# Patient Record
Sex: MALE | Race: OTHER | HISPANIC OR LATINO | URBAN - METROPOLITAN AREA
[De-identification: names, ages, dates, MRNs, and addresses within clinical notes are randomized per-mention and may not be internally consistent; named-entity substitution may affect disease eponyms.]

---

## 2019-02-11 PROBLEM — Z00.00 ENCOUNTER FOR PREVENTIVE HEALTH EXAMINATION: Status: ACTIVE | Noted: 2019-02-11

## 2019-02-13 ENCOUNTER — OUTPATIENT (OUTPATIENT)
Dept: OUTPATIENT SERVICES | Facility: HOSPITAL | Age: 49
LOS: 1 days | End: 2019-02-13
Payer: COMMERCIAL

## 2019-02-13 ENCOUNTER — APPOINTMENT (OUTPATIENT)
Dept: CT IMAGING | Facility: CLINIC | Age: 49
End: 2019-02-13
Payer: COMMERCIAL

## 2019-02-13 DIAGNOSIS — Z00.8 ENCOUNTER FOR OTHER GENERAL EXAMINATION: ICD-10-CM

## 2019-02-13 PROCEDURE — 74177 CT ABD & PELVIS W/CONTRAST: CPT | Mod: 26

## 2019-02-13 PROCEDURE — 74177 CT ABD & PELVIS W/CONTRAST: CPT

## 2019-04-27 ENCOUNTER — INPATIENT (INPATIENT)
Facility: HOSPITAL | Age: 49
LOS: 4 days | Discharge: HOME | DRG: 418 | End: 2019-05-02
Attending: SURGERY | Admitting: SURGERY
Payer: COMMERCIAL

## 2019-04-27 VITALS
HEIGHT: 66 IN | DIASTOLIC BLOOD PRESSURE: 79 MMHG | WEIGHT: 210.1 LBS | SYSTOLIC BLOOD PRESSURE: 143 MMHG | OXYGEN SATURATION: 95 % | TEMPERATURE: 99 F | RESPIRATION RATE: 20 BRPM | HEART RATE: 65 BPM

## 2019-04-27 DIAGNOSIS — Z95.2 PRESENCE OF PROSTHETIC HEART VALVE: Chronic | ICD-10-CM

## 2019-04-27 DIAGNOSIS — Z95.0 PRESENCE OF CARDIAC PACEMAKER: Chronic | ICD-10-CM

## 2019-04-27 DIAGNOSIS — K80.20 CALCULUS OF GALLBLADDER WITHOUT CHOLECYSTITIS WITHOUT OBSTRUCTION: ICD-10-CM

## 2019-04-27 LAB
ALBUMIN SERPL ELPH-MCNC: 4.2 G/DL — SIGNIFICANT CHANGE UP (ref 3.3–5.2)
ALP SERPL-CCNC: 71 U/L — SIGNIFICANT CHANGE UP (ref 40–120)
ALT FLD-CCNC: 243 U/L — HIGH
ANION GAP SERPL CALC-SCNC: 13 MMOL/L — SIGNIFICANT CHANGE UP (ref 5–17)
APPEARANCE UR: CLEAR — SIGNIFICANT CHANGE UP
AST SERPL-CCNC: 280 U/L — HIGH
BACTERIA # UR AUTO: ABNORMAL
BASOPHILS # BLD AUTO: 0 K/UL — SIGNIFICANT CHANGE UP (ref 0–0.2)
BASOPHILS NFR BLD AUTO: 0.1 % — SIGNIFICANT CHANGE UP (ref 0–2)
BILIRUB SERPL-MCNC: 2.8 MG/DL — HIGH (ref 0.4–2)
BILIRUB UR-MCNC: ABNORMAL
BUN SERPL-MCNC: 15 MG/DL — SIGNIFICANT CHANGE UP (ref 8–20)
CALCIUM SERPL-MCNC: 9.1 MG/DL — SIGNIFICANT CHANGE UP (ref 8.6–10.2)
CHLORIDE SERPL-SCNC: 100 MMOL/L — SIGNIFICANT CHANGE UP (ref 98–107)
CO2 SERPL-SCNC: 27 MMOL/L — SIGNIFICANT CHANGE UP (ref 22–29)
COLOR SPEC: YELLOW — SIGNIFICANT CHANGE UP
CREAT SERPL-MCNC: 0.95 MG/DL — SIGNIFICANT CHANGE UP (ref 0.5–1.3)
DIFF PNL FLD: ABNORMAL
EOSINOPHIL # BLD AUTO: 0 K/UL — SIGNIFICANT CHANGE UP (ref 0–0.5)
EOSINOPHIL NFR BLD AUTO: 0.3 % — SIGNIFICANT CHANGE UP (ref 0–5)
EPI CELLS # UR: SIGNIFICANT CHANGE UP
GLUCOSE SERPL-MCNC: 91 MG/DL — SIGNIFICANT CHANGE UP (ref 70–115)
GLUCOSE UR QL: NEGATIVE MG/DL — SIGNIFICANT CHANGE UP
HCT VFR BLD CALC: 44.2 % — SIGNIFICANT CHANGE UP (ref 42–52)
HGB BLD-MCNC: 14.9 G/DL — SIGNIFICANT CHANGE UP (ref 14–18)
KETONES UR-MCNC: NEGATIVE — SIGNIFICANT CHANGE UP
LEUKOCYTE ESTERASE UR-ACNC: ABNORMAL
LIDOCAIN IGE QN: 26 U/L — SIGNIFICANT CHANGE UP (ref 22–51)
LYMPHOCYTES # BLD AUTO: 1 K/UL — SIGNIFICANT CHANGE UP (ref 1–4.8)
LYMPHOCYTES # BLD AUTO: 14.9 % — LOW (ref 20–55)
MCHC RBC-ENTMCNC: 29.3 PG — SIGNIFICANT CHANGE UP (ref 27–31)
MCHC RBC-ENTMCNC: 33.7 G/DL — SIGNIFICANT CHANGE UP (ref 32–36)
MCV RBC AUTO: 87 FL — SIGNIFICANT CHANGE UP (ref 80–94)
MONOCYTES # BLD AUTO: 0.6 K/UL — SIGNIFICANT CHANGE UP (ref 0–0.8)
MONOCYTES NFR BLD AUTO: 8.3 % — SIGNIFICANT CHANGE UP (ref 3–10)
NEUTROPHILS # BLD AUTO: 5.3 K/UL — SIGNIFICANT CHANGE UP (ref 1.8–8)
NEUTROPHILS NFR BLD AUTO: 76.3 % — HIGH (ref 37–73)
NITRITE UR-MCNC: NEGATIVE — SIGNIFICANT CHANGE UP
PH UR: 7 — SIGNIFICANT CHANGE UP (ref 5–8)
PLATELET # BLD AUTO: 188 K/UL — SIGNIFICANT CHANGE UP (ref 150–400)
POTASSIUM SERPL-MCNC: 3.6 MMOL/L — SIGNIFICANT CHANGE UP (ref 3.5–5.3)
POTASSIUM SERPL-SCNC: 3.6 MMOL/L — SIGNIFICANT CHANGE UP (ref 3.5–5.3)
PROT SERPL-MCNC: 7.3 G/DL — SIGNIFICANT CHANGE UP (ref 6.6–8.7)
PROT UR-MCNC: 15 MG/DL
RBC # BLD: 5.08 M/UL — SIGNIFICANT CHANGE UP (ref 4.6–6.2)
RBC # FLD: 13.9 % — SIGNIFICANT CHANGE UP (ref 11–15.6)
RBC CASTS # UR COMP ASSIST: ABNORMAL /HPF (ref 0–4)
SODIUM SERPL-SCNC: 140 MMOL/L — SIGNIFICANT CHANGE UP (ref 135–145)
SP GR SPEC: 1.01 — SIGNIFICANT CHANGE UP (ref 1.01–1.02)
UROBILINOGEN FLD QL: 4 MG/DL
WBC # BLD: 7 K/UL — SIGNIFICANT CHANGE UP (ref 4.8–10.8)
WBC # FLD AUTO: 7 K/UL — SIGNIFICANT CHANGE UP (ref 4.8–10.8)
WBC UR QL: SIGNIFICANT CHANGE UP

## 2019-04-27 PROCEDURE — 99285 EMERGENCY DEPT VISIT HI MDM: CPT

## 2019-04-27 PROCEDURE — 99223 1ST HOSP IP/OBS HIGH 75: CPT

## 2019-04-27 PROCEDURE — 76705 ECHO EXAM OF ABDOMEN: CPT | Mod: 26

## 2019-04-27 RX ORDER — ENOXAPARIN SODIUM 100 MG/ML
40 INJECTION SUBCUTANEOUS DAILY
Qty: 0 | Refills: 0 | Status: DISCONTINUED | OUTPATIENT
Start: 2019-04-27 | End: 2019-04-29

## 2019-04-27 RX ORDER — ONDANSETRON 8 MG/1
4 TABLET, FILM COATED ORAL EVERY 4 HOURS
Qty: 0 | Refills: 0 | Status: DISCONTINUED | OUTPATIENT
Start: 2019-04-27 | End: 2019-05-01

## 2019-04-27 RX ORDER — SODIUM CHLORIDE 9 MG/ML
1000 INJECTION, SOLUTION INTRAVENOUS
Qty: 0 | Refills: 0 | Status: DISCONTINUED | OUTPATIENT
Start: 2019-04-27 | End: 2019-05-01

## 2019-04-27 RX ORDER — CEFOTETAN DISODIUM 1 G
VIAL (EA) INJECTION
Qty: 0 | Refills: 0 | Status: DISCONTINUED | OUTPATIENT
Start: 2019-04-27 | End: 2019-05-01

## 2019-04-27 RX ORDER — CEFOTETAN DISODIUM 1 G
2 VIAL (EA) INJECTION ONCE
Qty: 0 | Refills: 0 | Status: COMPLETED | OUTPATIENT
Start: 2019-04-27 | End: 2019-04-27

## 2019-04-27 RX ORDER — ONDANSETRON 8 MG/1
4 TABLET, FILM COATED ORAL ONCE
Qty: 0 | Refills: 0 | Status: COMPLETED | OUTPATIENT
Start: 2019-04-27 | End: 2019-04-27

## 2019-04-27 RX ORDER — SODIUM CHLORIDE 9 MG/ML
1000 INJECTION INTRAMUSCULAR; INTRAVENOUS; SUBCUTANEOUS ONCE
Qty: 0 | Refills: 0 | Status: COMPLETED | OUTPATIENT
Start: 2019-04-27 | End: 2019-04-27

## 2019-04-27 RX ORDER — MORPHINE SULFATE 50 MG/1
1 CAPSULE, EXTENDED RELEASE ORAL EVERY 4 HOURS
Qty: 0 | Refills: 0 | Status: DISCONTINUED | OUTPATIENT
Start: 2019-04-27 | End: 2019-04-29

## 2019-04-27 RX ORDER — CEFOTETAN DISODIUM 1 G
2 VIAL (EA) INJECTION EVERY 12 HOURS
Qty: 0 | Refills: 0 | Status: DISCONTINUED | OUTPATIENT
Start: 2019-04-28 | End: 2019-05-01

## 2019-04-27 RX ORDER — SODIUM CHLORIDE 9 MG/ML
3 INJECTION INTRAMUSCULAR; INTRAVENOUS; SUBCUTANEOUS ONCE
Qty: 0 | Refills: 0 | Status: COMPLETED | OUTPATIENT
Start: 2019-04-27 | End: 2019-04-27

## 2019-04-27 RX ADMIN — SODIUM CHLORIDE 3 MILLILITER(S): 9 INJECTION INTRAMUSCULAR; INTRAVENOUS; SUBCUTANEOUS at 19:30

## 2019-04-27 RX ADMIN — MORPHINE SULFATE 1 MILLIGRAM(S): 50 CAPSULE, EXTENDED RELEASE ORAL at 22:03

## 2019-04-27 RX ADMIN — ONDANSETRON 4 MILLIGRAM(S): 8 TABLET, FILM COATED ORAL at 22:04

## 2019-04-27 RX ADMIN — SODIUM CHLORIDE 75 MILLILITER(S): 9 INJECTION, SOLUTION INTRAVENOUS at 22:04

## 2019-04-27 RX ADMIN — Medication 100 GRAM(S): at 22:29

## 2019-04-27 RX ADMIN — SODIUM CHLORIDE 1000 MILLILITER(S): 9 INJECTION INTRAMUSCULAR; INTRAVENOUS; SUBCUTANEOUS at 19:00

## 2019-04-27 NOTE — ED STATDOCS - NS ED ROS FT
Review of Systems  •	CONSTITUTIONAL - no  fever, no diaphoresis, no weight change  •	SKIN - no rash  •	HEMATOLOGIC - no bleeding, no bruising  •	EYES - no eye pain, no blurred vision  •	ENT - no change in hearing, no pain  •	RESPIRATORY - no shortness of breath, no cough  •	CARDIAC - no chest pain, no palpitations  •	GI - (+) abd pain,(+) nausea, (+) vomiting, no diarrhea, no constipation, no bleeding  •	GENITO-URINARY - no discharge, no dysuria; no hematuria,   •	ENDO - no polydypsia, no polyurea, no heat/no cold intolerance  •	MUSCULOSKELETAL - no joint pain, no swelling, no redness  •	NEUROLOGIC - no weakness, no headache, no anesthesia, no paresthesias  •	PSYCH - no anxiety, non suicidal, non homicidal, no hallucination, no depression

## 2019-04-27 NOTE — ED STATDOCS - OBJECTIVE STATEMENT
47 y/o M with PMHx tetralogy of fallot s/p multiple open heart surgeries , presents to ED c/o abdominal pain since this morning. 2am this morning pt woke up in pain in abdominal region and back. Went to bathroom and had an episode of emesis. Tried to go back to sleep. Again woke up at 6am and had another episode of emesis. 9am pt had breakfast and then vomited it all up. Has not been able to tolerate solids PO, but has been tolerating water. 1 month ago pt had a diverticulitis attack. 3 years ago pt had a gallbladder attack that did not require removal of the gallbladder. Denies diarrhea, SOB, CP.   EKG done a urgent care shows left bundle branch block.   Dr. Akil Navarrete - [place] - cardiologist 49 y/o M with PMHx tetralogy of fallot s/p multiple open heart surgeries , presents to ED c/o abdominal pain since this morning. 2am this morning pt woke up in pain in abdominal region and back. Went to bathroom and had an episode of emesis. Tried to go back to sleep. Again woke up at 6am and had another episode of emesis. 9am pt had breakfast and then vomited it all up. Has not been able to tolerate solids PO, but has been tolerating water. 1 month ago pt had a "diverticulitis attack". 3 years ago pt had a "gallbladder attack" that did not require removal of the gallbladder. Denies diarrhea, SOB, CP.   EKG done a urgent care shows left bundle branch block.   Dr. Akil Navarrete - [place] - cardiologist

## 2019-04-27 NOTE — ED ADULT NURSE NOTE - OBJECTIVE STATEMENT
48 yr old male a+ox4 presents to ED c/o RUQ pain and epigastric pain 48 yr old male a+ox4 presents to ED c/o RUQ pain and epigastric pain.  pt was sent to ED from urgent care where he had an abnormal ekg.  pt has hx multiple valve replacements and pacemaker.  pt states that "years ago" he had a problem with his gallbladder that did not require surgery.  pt also reports recently having had a diverticulitis flair up.

## 2019-04-27 NOTE — ED STATDOCS - PROGRESS NOTE DETAILS
pt is seen by dr durham initially agreed with hx - PE plan   recurrent hx of gallstone   cholelithiases and elevated LFT called Surgery team to evaluate the pt surgery seen and evaluated the patient, will admit to surgical services

## 2019-04-27 NOTE — H&P ADULT - PROBLEM SELECTOR PLAN 1
1. Admit to ACS/Trauma under Dr. Nino   2. NPO, IVFs and IV ABx   3. MRCP ordered  4. Consult GI (given elevated T bili) and cards (given patient's cardiac Hx)   5. Hepatitis panel ordered for AM labs 1. Admit to ACS/Trauma under Dr. Nino   2. NPO, IVFs and IV ABx   3. Unable to obtain MRCP given presence of pacemaker  4. Consult GI for ERCP and cards for risk stratification (given patient's cardiac Hx)   5. Hepatitis panel ordered for AM labs 1. Admit to ACS/Trauma under Dr. Nino   2. NPO, IVFs and IV ABx   3. Unable to obtain MRCP given presence of pacemaker  4. Consult GI for ERCP (Dr. Timmons [phone]) and cards (Dr. Myrick [phone]) for risk stratification (given patient's cardiac Hx)   5. Hepatitis panel ordered for AM labs 1. Admit to ACS/Trauma under Dr. Nino   2. NPO, IVFs and IV ABx   3. Unable to obtain MRCP given presence of pacemaker that is not MRI compatible   4. Consult GI for ERCP (Dr. Timmons [phone]) and cards (Dr. Myrick [phone]) for risk stratification (given patient's cardiac Hx)   5. Hepatitis panel ordered for AM labs

## 2019-04-27 NOTE — ED STATDOCS - ATTENDING CONTRIBUTION TO CARE
I, Eduardo Fischer, performed the initial face to face bedside interview with this patient regarding history of present illness, review of symptoms and relevant past medical, social and family history.  I completed an independent physical examination.  I was the initial provider who evaluated this patient. I have signed out the follow up of any pending tests (i.e. labs, radiological studies) to the ACP.  I have communicated the patient’s plan of care and disposition with the ACP.

## 2019-04-27 NOTE — ED ADULT TRIAGE NOTE - CHIEF COMPLAINT QUOTE
Patient arrived to ED today with c/o abdominal pain and vomiting since 2am.  Patient states that he took Motrin this AM due to pain.

## 2019-04-27 NOTE — H&P ADULT - ASSESSMENT
48 year old male with symptomatic cholelithiasis with plan to r/u choledocholithiasis given elevated T bili currently afebrile and hemodynamically stable ordered for MRCP.

## 2019-04-27 NOTE — ED STATDOCS - CLINICAL SUMMARY MEDICAL DECISION MAKING FREE TEXT BOX
Pt with congestive heart disease, hx of diverticulitis, presents with epigastric pain. Will get blood work and US of gallbladder to rule out gallstones.

## 2019-04-27 NOTE — H&P ADULT - NSHPPHYSICALEXAM_GEN_ALL_CORE
Constitutional: Patient resting comfortably in bed in no acute distress   HEENT: No scleral icterus   Neck: No JVD, full ROM w/o pain   Respiratory: CTAB with unlabored respirations, no accessory muscle use or conversational dyspnea   Cardiovascular: Well healed midline sternal scar, normal S1 and S2   Gastrointestinal: Abdomen soft, mild epigastric tender, non-distended with no rebound tenderness or guarding   Rectal: Not indicated   Neurological: GCS 15 (E4V5M6) with no gross sensory, motor or coordination deficits   Psychiatric: Normal mood and affect   Musculoskeletal: No joint pain, swelling or deformity with no limitation of movement

## 2019-04-27 NOTE — ED ADULT NURSE NOTE - NSIMPLEMENTINTERV_GEN_ALL_ED
Implemented All Universal Safety Interventions:  [place] to call system. Call bell, personal items and telephone within reach. Instruct patient to call for assistance. Room bathroom lighting operational. Non-slip footwear when patient is off stretcher. Physically safe environment: no spills, clutter or unnecessary equipment. Stretcher in lowest position, wheels locked, appropriate side rails in place.

## 2019-04-27 NOTE — ED STATDOCS - PHYSICAL EXAMINATION
VITAL SIGNS: I have reviewed nursing notes and confirm.  CONSTITUTIONAL: Well-developed; well-nourished; in no acute distress.  SKIN: Skin exam is warm and dry, no acute rash.  HEAD: Normocephalic; atraumatic.  EYES: PERRL, EOM intact; conjunctiva and sclera clear.  ENT: No nasal discharge; airway clear. Throat clear.  NECK: Supple; non tender.    CARD: S1, S2 normal; no murmurs, gallops, or rubs. Regular rate and rhythm.  RESP: No wheezes,  no rales or rhonchi.   ABD:  soft; non-distended; (+) epigastric pain   EXT: Normal ROM. No clubbing, cyanosis or edema.  NEURO: Alert, oriented. Grossly unremarkable. No focal deficits. no facial droop, moves all extremities  PSYCH: Cooperative, appropriate.

## 2019-04-27 NOTE — H&P ADULT - HISTORY OF PRESENT ILLNESS
48 year old male presenting with chief complaint of abdominal pain x 1 day. Patient seen and examined at bedside with senior resident. Reports that he was awoken from sleep this morning with severe epigastric pain radiating ot RUQ and right back. Pain rated 101/10 and described as sharp, constant. Associated with multiple episodes (more than 5) of non bloody non bilious emesis. ROS associated with subjective fevers and chills. Having normal bowel function (last BM day prior to presentation) ROS negative for dizziness/lightheadedness, chest pain, SOB, dyspnea, dysuria or changes in bowel habits.     PMHx: Tetralogy of Fallot - Cardiologist Dr. Akil Navarrete @ [facility]   PSHx: Open heart surgeries (6 months, 6yr old) Pacemaker, stent placement and valve repair kiara 5yrs ago - CTS Dr. Soriano @ [facility]    Allergies: None   Meds: Baby ASA, Metoprolol 25mg BID, losartan-HCTZ 100-25mg QD, started taking xarelto after cardiac stent placement and stopped it 2-3yrs ago   FamHx: Lung CA in maternal grandmother, bladder CA in father, pancreatic CA in maternal aunt   SocHx: Former social smoker (high school and college) denies EtOH and IV drug use

## 2019-04-28 LAB
ALBUMIN SERPL ELPH-MCNC: 3.8 G/DL — SIGNIFICANT CHANGE UP (ref 3.3–5.2)
ALP SERPL-CCNC: 67 U/L — SIGNIFICANT CHANGE UP (ref 40–120)
ALT FLD-CCNC: 203 U/L — HIGH
ANION GAP SERPL CALC-SCNC: 14 MMOL/L — SIGNIFICANT CHANGE UP (ref 5–17)
AST SERPL-CCNC: 134 U/L — HIGH
BASOPHILS # BLD AUTO: 0 K/UL — SIGNIFICANT CHANGE UP (ref 0–0.2)
BASOPHILS NFR BLD AUTO: 0.2 % — SIGNIFICANT CHANGE UP (ref 0–2)
BILIRUB DIRECT SERPL-MCNC: 0.3 MG/DL — SIGNIFICANT CHANGE UP (ref 0–0.3)
BILIRUB INDIRECT FLD-MCNC: 1.5 MG/DL — HIGH (ref 0.2–1)
BILIRUB SERPL-MCNC: 1.8 MG/DL — SIGNIFICANT CHANGE UP (ref 0.4–2)
BUN SERPL-MCNC: 15 MG/DL — SIGNIFICANT CHANGE UP (ref 8–20)
CALCIUM SERPL-MCNC: 8.7 MG/DL — SIGNIFICANT CHANGE UP (ref 8.6–10.2)
CHLORIDE SERPL-SCNC: 103 MMOL/L — SIGNIFICANT CHANGE UP (ref 98–107)
CO2 SERPL-SCNC: 26 MMOL/L — SIGNIFICANT CHANGE UP (ref 22–29)
CREAT SERPL-MCNC: 1.15 MG/DL — SIGNIFICANT CHANGE UP (ref 0.5–1.3)
EOSINOPHIL # BLD AUTO: 0.1 K/UL — SIGNIFICANT CHANGE UP (ref 0–0.5)
EOSINOPHIL NFR BLD AUTO: 2.1 % — SIGNIFICANT CHANGE UP (ref 0–5)
GLUCOSE SERPL-MCNC: 83 MG/DL — SIGNIFICANT CHANGE UP (ref 70–115)
HAV IGM SER-ACNC: SIGNIFICANT CHANGE UP
HBV CORE IGM SER-ACNC: SIGNIFICANT CHANGE UP
HBV SURFACE AG SER-ACNC: SIGNIFICANT CHANGE UP
HCT VFR BLD CALC: 42 % — SIGNIFICANT CHANGE UP (ref 42–52)
HCV AB S/CO SERPL IA: 0.71 S/CO — SIGNIFICANT CHANGE UP (ref 0–0.99)
HCV AB SERPL-IMP: SIGNIFICANT CHANGE UP
HGB BLD-MCNC: 14 G/DL — SIGNIFICANT CHANGE UP (ref 14–18)
LYMPHOCYTES # BLD AUTO: 1.1 K/UL — SIGNIFICANT CHANGE UP (ref 1–4.8)
LYMPHOCYTES # BLD AUTO: 23.5 % — SIGNIFICANT CHANGE UP (ref 20–55)
MAGNESIUM SERPL-MCNC: 2 MG/DL — SIGNIFICANT CHANGE UP (ref 1.6–2.6)
MCHC RBC-ENTMCNC: 29.4 PG — SIGNIFICANT CHANGE UP (ref 27–31)
MCHC RBC-ENTMCNC: 33.3 G/DL — SIGNIFICANT CHANGE UP (ref 32–36)
MCV RBC AUTO: 88.1 FL — SIGNIFICANT CHANGE UP (ref 80–94)
MONOCYTES # BLD AUTO: 0.4 K/UL — SIGNIFICANT CHANGE UP (ref 0–0.8)
MONOCYTES NFR BLD AUTO: 8.1 % — SIGNIFICANT CHANGE UP (ref 3–10)
NEUTROPHILS # BLD AUTO: 3.1 K/UL — SIGNIFICANT CHANGE UP (ref 1.8–8)
NEUTROPHILS NFR BLD AUTO: 65.9 % — SIGNIFICANT CHANGE UP (ref 37–73)
PHOSPHATE SERPL-MCNC: 3.8 MG/DL — SIGNIFICANT CHANGE UP (ref 2.4–4.7)
PLATELET # BLD AUTO: 183 K/UL — SIGNIFICANT CHANGE UP (ref 150–400)
POTASSIUM SERPL-MCNC: 3.5 MMOL/L — SIGNIFICANT CHANGE UP (ref 3.5–5.3)
POTASSIUM SERPL-SCNC: 3.5 MMOL/L — SIGNIFICANT CHANGE UP (ref 3.5–5.3)
PROT SERPL-MCNC: 6.5 G/DL — LOW (ref 6.6–8.7)
RBC # BLD: 4.77 M/UL — SIGNIFICANT CHANGE UP (ref 4.6–6.2)
RBC # FLD: 14 % — SIGNIFICANT CHANGE UP (ref 11–15.6)
SODIUM SERPL-SCNC: 143 MMOL/L — SIGNIFICANT CHANGE UP (ref 135–145)
WBC # BLD: 4.7 K/UL — LOW (ref 4.8–10.8)
WBC # FLD AUTO: 4.7 K/UL — LOW (ref 4.8–10.8)

## 2019-04-28 PROCEDURE — 99223 1ST HOSP IP/OBS HIGH 75: CPT

## 2019-04-28 PROCEDURE — 93010 ELECTROCARDIOGRAM REPORT: CPT

## 2019-04-28 RX ORDER — POTASSIUM CHLORIDE 20 MEQ
20 PACKET (EA) ORAL
Qty: 0 | Refills: 0 | Status: COMPLETED | OUTPATIENT
Start: 2019-04-28 | End: 2019-04-28

## 2019-04-28 RX ADMIN — Medication 50 MILLIEQUIVALENT(S): at 14:29

## 2019-04-28 RX ADMIN — MORPHINE SULFATE 1 MILLIGRAM(S): 50 CAPSULE, EXTENDED RELEASE ORAL at 14:59

## 2019-04-28 RX ADMIN — ENOXAPARIN SODIUM 40 MILLIGRAM(S): 100 INJECTION SUBCUTANEOUS at 12:11

## 2019-04-28 RX ADMIN — Medication 100 GRAM(S): at 17:55

## 2019-04-28 RX ADMIN — ONDANSETRON 4 MILLIGRAM(S): 8 TABLET, FILM COATED ORAL at 14:30

## 2019-04-28 RX ADMIN — MORPHINE SULFATE 1 MILLIGRAM(S): 50 CAPSULE, EXTENDED RELEASE ORAL at 14:29

## 2019-04-28 RX ADMIN — Medication 100 GRAM(S): at 06:13

## 2019-04-28 RX ADMIN — SODIUM CHLORIDE 75 MILLILITER(S): 9 INJECTION, SOLUTION INTRAVENOUS at 14:30

## 2019-04-28 RX ADMIN — Medication 50 MILLIEQUIVALENT(S): at 12:11

## 2019-04-28 NOTE — CONSULT NOTE ADULT - ASSESSMENT
49 y/o male with hx of Tetralogy of Fallot s/p correction ( age 6 months, age 6 years), aortic grafts/stenting, valve repair (PV +/- AV), RVOT/PA graft? , VSD repair, atrial flutter s/p DCCV, s/p ICD presents with 1 day hx of bilateral upper abdominal pain, nausea,vomiting.  Noted with cholelithiasis.  At baseline, active, no exertional chest pain, shortness of breath.  No palpitations, dizziness, syncope.    Pediatric Cardiologist - Akil Navarrete at [facility]     # Tetralogy of Fallot  # symptomatic cholelithiasis      Needs TTE to fully assess cardiac risk/valves. On exam, no active cardiac conditions.     IVF - avoid volume overload  Resume metoprolol (hold for HR < 60, SBP < 90) - while NPO can place of lopressor 5 mg IV q6 hr with the same holding parameters  losartan with holding parameters (Hold for SBP < 100)  further recommendations pending echo    Will follow.  Will obtain records from [facility].   Thank you for allowing me to participate in care of your patient.   D/W surgical team 49 y/o male with hx of Tetralogy of Fallot s/p correction ( age 6 months, age 6 years), aortic grafts/stenting, valve repair (PV +/- AV), RVOT/PA graft? , VSD repair, atrial flutter s/p DCCV, s/p ICD presents with 1 day hx of bilateral upper abdominal pain, nausea,vomiting.  Noted with cholelithiasis.  At baseline, active, no exertional chest pain, shortness of breath.  No palpitations, dizziness, syncope.    Pediatric Cardiologist - Akil Navarrete at [facility]     # Tetralogy of Fallot  # symptomatic cholelithiasis  # hx atrial flutter    Needs TTE to fully assess cardiac risk/valves. On exam, no active cardiac conditions.     IVF - avoid volume overload  Resume metoprolol (hold for HR < 60, SBP < 90) - while NPO can place of lopressor 5 mg IV q6 hr with the same holding parameters  losartan with holding parameters (Hold for SBP < 100)  further recommendations pending echo  ICD interrogation  12 lead ECG    Will follow.  Will obtain records from [facility].   Thank you for allowing me to participate in care of your patient.   D/W surgical team

## 2019-04-28 NOTE — CONSULT NOTE ADULT - SUBJECTIVE AND OBJECTIVE BOX
[facility]                                                        Office: [address] [address]                                                       Telephone: [phone]. Fax:[phone]    Patient is a 48y old  Male who presents with a chief complaint of Symptomatic cholelithiasis r/u choledocholithiasis (2019 21:30)      HPI: 49 y/o male with hx of Tetralogy of Fallot s/p correction ( age 6 months, age 6 years), aortic grafts/stenting, valve repair (PV +/- AV), RVOT/PA graft? , VSD repair, atrial flutter s/p DCCV, s/p ICD presents with 1 day hx of bilateral upper abdominal pain, nausea,vomiting.  Noted with cholelithiasis.  At baseline, active, no exertional chest pain, shortness of breath.  No palpitations, dizziness, syncope.    Pediatric Cardiologist - Akil Navarrete at [facility]    PAST MEDICAL & SURGICAL HISTORY:  History of heart valve replacement  History of pacemaker  Tetralogy of Fallot s/p correction  diverticulitis    Allergies    Itchy throat, Cats (Other)  No Known Drug Allergies    Intolerances    Home Medications:  Meds: Baby ASA, Metoprolol 25mg BID, losartan-HCTZ 100-25mg QD, started taking xarelto after cardiac stent placement and stopped it 2-3yrs ago     MEDICATIONS  (STANDING):  cefoTEtan  IVPB      cefoTEtan  IVPB 2 Gram(s) IV Intermittent every 12 hours  enoxaparin Injectable 40 milliGRAM(s) SubCutaneous daily  lactated ringers. 1000 milliLiter(s) (75 mL/Hr) IV Continuous <Continuous>    MEDICATIONS  (PRN):  morphine  - Injectable 1 milliGRAM(s) IV Push every 4 hours PRN Severe Pain (7 - 10)  ondansetron Injectable 4 milliGRAM(s) IV Push every 4 hours PRN Nausea and/or Vomiting      FAMILY HISTORY:  Lung CA in maternal grandmother, bladder CA in father, pancreatic CA in maternal aunt   brother  as an infant - congenital heart disease    SOCIAL HISTORY: No tobacco/ No etoh/ No illicit drug use  Former social smoker (high school and college) denies EtOH and IV drug use     PREVIOUS DIAGNOSTIC TESTING:  NONE IN OUR SYSTEM     ECHO FINDINGS:   STRESS FINDINGS:   CATHETERIZATION FINDINGS:     REVIEW OF SYSTEMS:  CONSTITUTIONAL: [-]fever   [-] weight loss   [-] fatigue  EYES: [-]  eye pain   [-] visual disturbances      [-]  discharge  ENMT:  [-]  difficulty hearing,   [-]  tinnitus   [-] vertigo    [-]  sinus or throat pain  NECK: [-]  pain or stiffness  RESPIRATORY: [-]  cough 	[-] wheezing 	[-]  hemoptysis 		[-]   Shortness of Breath  CARDIOVASCULAR: [-]  chest pain	[-] palpitations		[-]  passing out 		[-] dizziness 	[-]  leg swelling  		[-]  PND 	[-] orthopnea  GASTROINTESTINAL: [+]  abdominal pain		[+]nausea	[+] vomiting	[-]  hematemesis 	[-]  diarrhea  	[-] constipation 		[-]  melena 	[-] hematochezia.  GENITOURINARY: [-] dysuria	[-] frequency	[-] hematuria	[-]  incontinence  NEUROLOGICAL: [-]  headaches		[-] memory loss 	[-]  loss of strength  			[-]  numbness/tingling 	[-]  tremors  SKIN: [-]  itching 	[-] rashes 	[-]  lesions   LYMPH Nodes: [-] enlarged glands  ENDOCRINE: [-] heat or cold intolerance 	[-]   hair loss  MUSCULOSKELETAL: [-] joint pain  [-] joint swelling	[-]  muscle, back, or extremity pain  PSYCHIATRIC: [-]  depression	[-] anxiety	[-] mood swings		[-]  difficulty sleeping   HEME: [-]  easy bruising 	[-]  bleeding   ALLERY AND IMMUNOLOGIC: [-]  hives or eczema	      Vital Signs Last 24 Hrs  T(C): 36.7 (2019 05:05), Max: 37.1 (2019 23:50)  T(F): 98 (2019 05:05), Max: 98.7 (2019 23:50)  HR: 61 (2019 05:05) (61 - 68)  BP: 95/59 (2019 05:05) (95/59 - 143/79)  BP(mean): --  RR: 18 (2019 05:05) (18 - 20)  SpO2: 98% (2019 05:05) (95% - 98%)  Daily Height in cm: 167.64 (2019 17:28)    Daily   I&O's Detail    2019 07:01  -  2019 07:00  --------------------------------------------------------  IN:    lactated ringers.: 525 mL  Total IN: 525 mL    OUT:    Voided: 325 mL  Total OUT: 325 mL    Total NET: 200 mL            PHYSICAL EXAM:  Appearance: Normal, well nourished, NAD	  HEENT:   Normal oral mucosa, PERRL, EOMI, sclera non-icteric	  Lymphatic: No cervical lymphadenopathy  Cardiovascular: Normal S1 S2, No JVD, III/VI systolic murmur, II/VI diastolic murmur, No carotid bruits, No peripheral edema  Respiratory: Lungs clear to auscultation	  Psychiatry: A & O x 3, Mood & affect appropriate  Gastrointestinal:  Soft, Non-tender, + BS, no bruits	  Skin: No rashes, No ecchymoses, No cyanosis, Dry, well healed sternotomy scar  Neurologic: Grossly non-focal with full strength in all four extremities  Extremities: Normal range of motion, No clubbing, cyanosis or edema  Vascular: Peripheral pulses palpable 2+ bilaterally, warm    INTERPRETATION OF TELEMETRY: V paced    ECG (tracing reviewed by me): at urgent care - SR, 1st degree AVB, RAD, RVH, IVCD (?V paced)     LABS:                        14.0   4.7   )-----------( 183      ( 2019 07:13 )             42.0         143  |  103  |  15.0  ----------------------------<  83  3.5   |  26.0  |  1.15    Ca    8.7      2019 07:13  Phos  3.8       Mg     2.0         TPro  7.3  /  Alb  4.2  /  TBili  2.8<H>  /  DBili  x   /  AST  280<H>  /  ALT  243<H>  /  AlkPhos  71        Urinalysis Basic - ( 2019 18:19 )    Color: Yellow / Appearance: Clear / S.015 / pH: x  Gluc: x / Ketone: Negative  / Bili: Small / Urobili: 4 mg/dL   Blood: x / Protein: 15 mg/dL / Nitrite: Negative   Leuk Esterase: Trace / RBC: 3-5 /HPF / WBC 0-2   Sq Epi: x / Non Sq Epi: Occasional / Bacteria: Few      BNP    RADIOLOGY & ADDITIONAL STUDIES:  < from: US Gallbladder (19 @ 18:39) >  IMPRESSION:  Cholelithiasis and gallbladder sludge withoutwall   thickening or pericholecystic fluid. Clinical Equivocal sonographic   Turner's sign. Mildly dilated common bile duct. If there is further   clinical concern for acute cholecystitis a HIDA scan is suggested for   further evaluation.      < end of copied text >

## 2019-04-28 NOTE — PROGRESS NOTE ADULT - SUBJECTIVE AND OBJECTIVE BOX
HPI/OVERNIGHT EVENTS:  No acute events overnight. Patient states his pain is improving, but still present. Afebrile, VSS, HDS. denies f/c/n/v/cp/sob.    MEDICATIONS  (STANDING):  cefoTEtan  IVPB      cefoTEtan  IVPB 2 Gram(s) IV Intermittent every 12 hours  enoxaparin Injectable 40 milliGRAM(s) SubCutaneous daily  lactated ringers. 1000 milliLiter(s) (75 mL/Hr) IV Continuous <Continuous>  potassium chloride  20 mEq/100 mL IVPB 20 milliEquivalent(s) IV Intermittent every 2 hours    MEDICATIONS  (PRN):  morphine  - Injectable 1 milliGRAM(s) IV Push every 4 hours PRN Severe Pain (7 - 10)  ondansetron Injectable 4 milliGRAM(s) IV Push every 4 hours PRN Nausea and/or Vomiting      Vital Signs Last 24 Hrs  T(C): 36.8 (2019 07:55), Max: 37.1 (2019 23:50)  T(F): 98.2 (2019 07:55), Max: 98.7 (2019 23:50)  HR: 58 (2019 07:55) (58 - 68)  BP: 107/71 (2019 07:55) (95/59 - 143/79)  BP(mean): --  RR: 18 (2019 07:55) (18 - 20)  SpO2: 95% (2019 07:55) (95% - 98%)    Physical Exam: Constitutional: Patient resting comfortably in bed in no acute distress   HEENT: No scleral icterus   Neck: No JVD, full ROM w/o pain   Respiratory: CTAB with unlabored respirations, no accessory muscle use or conversational dyspnea   Cardiovascular: Well healed midline sternal scar, normal S1 and S2   Gastrointestinal: Abdomen soft, mild epigastric tender, non-distended with no rebound tenderness or guarding   Rectal: Not indicated   Neurological: GCS 15 (E4V5M6) with no gross sensory, motor or coordination deficits   Psychiatric: Normal mood and affect  Musculoskeletal: No joint pain, swelling or deformity with no limitation of movement	        I&O's Detail    2019 07:01  -  2019 07:00  --------------------------------------------------------  IN:    lactated ringers.: 525 mL  Total IN: 525 mL    OUT:    Voided: 325 mL  Total OUT: 325 mL    Total NET: 200 mL          LABS:                        14.0   4.7   )-----------( 183      ( 2019 07:13 )             42.0         143  |  103  |  15.0  ----------------------------<  83  3.5   |  26.0  |  1.15    Ca    8.7      2019 07:13  Phos  3.8       Mg     2.0         TPro  6.5<L>  /  Alb  3.8  /  TBili  1.8  /  DBili  0.3  /  AST  134<H>  /  ALT  203<H>  /  AlkPhos  67        Urinalysis Basic - ( 2019 18:19 )    Color: Yellow / Appearance: Clear / S.015 / pH: x  Gluc: x / Ketone: Negative  / Bili: Small / Urobili: 4 mg/dL   Blood: x / Protein: 15 mg/dL / Nitrite: Negative   Leuk Esterase: Trace / RBC: 3-5 /HPF / WBC 0-2   Sq Epi: x / Non Sq Epi: Occasional / Bacteria: Few

## 2019-04-28 NOTE — CONSULT NOTE ADULT - ASSESSMENT
48y man with severe epigastric/RUQ pain found to have elevated LFTs. Possible choledocholithiasis. Us abdomen with dilated CBD but LFts improving. ?? passed stone.  - NPO  - IVF  - pain control  - monitor LFTs   - possible EUS +/- ercp pending cardiac clearance      Thanks

## 2019-04-28 NOTE — CONSULT NOTE ADULT - REASON FOR ADMISSION
Symptomatic cholelithiasis r/u choledocholithiasis
Symptomatic cholelithiasis r/u choledocholithiasis

## 2019-04-28 NOTE — CONSULT NOTE ADULT - SUBJECTIVE AND OBJECTIVE BOX
HISTORY OF PRESENT ILLNESS:  This is a 48y old Male with a past medical history significant for tetralogy of fallot, Open heart surgeries (6 months, 6yr old) Pacemaker, stent placement and valve repair kiara 5yrs ago pw abdominal pain x 1 day. Patient had severe epigastric pain radiating ot RUQ and right back. Pain rated 101/10 and described as sharp, constant.  He had nausea and vomiting.  ROS associated with subjective fevers and chills.     REVIEW OF SYSTEMS:  Constitutional:  No unintentional weight loss, fevers, chills or night sweats	  Eyes: No eye pain, redness, discharge, or proptosis  ENMT: No sore throat, ear pain, mouth sores, or swollen glands in the neck  Respiratory: No dyspnea, cough or wheezing  Cardiovascular: No chest pain, dyspnea on exertion, or orthopnea  Gastrointestinal:	Please see HPI  Genitourinary: No dysuria or hematuria  Neurological:	 No changes in sleep/wake cycle, convulsions, confusion, dizziness or lightheadedness  Psychiatric: No changes in personality or emotional problems   Hematology: No easy bruising   Endocrine: No hot or cold flashes or deepening of voice	  All other review of systems were completed and were otherwise negative save what is reported in the HPI.    PAST MEDICAL/SURGICAL HISTORY:  Tetralogy of Fallot  Diverticulitis  History of heart valve replacement  History of pacemaker    SOCIAL HISTORY:  - TOBACCO:  - ALCOHOL:  - ILLICIT DRUG USE: Denies    FAMILY HISTORY:  No known history of gastrointestinal or liver disease;      HOME MEDICATIONS:    INPATIENT MEDICATIONS:  MEDICATIONS  (STANDING):  cefoTEtan  IVPB      cefoTEtan  IVPB 2 Gram(s) IV Intermittent every 12 hours  enoxaparin Injectable 40 milliGRAM(s) SubCutaneous daily  lactated ringers. 1000 milliLiter(s) (75 mL/Hr) IV Continuous <Continuous>    MEDICATIONS  (PRN):  morphine  - Injectable 1 milliGRAM(s) IV Push every 4 hours PRN Severe Pain (7 - 10)  ondansetron Injectable 4 milliGRAM(s) IV Push every 4 hours PRN Nausea and/or Vomiting    ALLERGIES:  Itchy throat, Cats (Other)  No Known Drug Allergies    VITAL SIGNS LAST 24 HOURS:  T(C): 38.1 (2019 15:37), Max: 38.1 (2019 15:37)  T(F): 100.6 (2019 15:37), Max: 100.6 (2019 15:37)  HR: 60 (2019 15:37) (58 - 68)  BP: 99/63 (2019 15:37) (95/59 - 136/68)  BP(mean): --  RR: 18 (2019 15:37) (18 - 18)  SpO2: 91% (2019 15:37) (91% - 98%)      19 @ 07:01  -  19 @ 07:00  --------------------------------------------------------  IN: 525 mL / OUT: 325 mL / NET: 200 mL        19 @ 07:01  -  19 @ 07:00  --------------------------------------------------------  IN: 525 mL / OUT: 325 mL / NET: 200 mL    PHYSICAL EXAM:  Constitutional: Well-developed, well-nourished, in no apparent distress  Eyes: Sclerae anicteric, conjunctivae normal  ENMT: Mucus membranes moist, no oropharyngeal thrush noted  Neck: No thyroid nodules appreciated, no significant cervical or supraclavicular lymphadenopathy  Respiratory: Breathing nonlabored; clear to auscultation  Cardiovascular: Regular rate and rhythm  Gastrointestinal: Soft, nontender, nondistended, normoactive bowel sounds; no hepatosplenomegaly appreciated; no rebound tenderness or involuntary guarding  Rectal: Perianal exam within normal limits; normal sphincter tone; brown stool on glove  Extremities: No clubbing, cyanosis or edema  Neurological: Alert and oriented to person, place and time; no asterixis  Skin: No jaundice  Lymph Nodes: No significant lymphadenopathy  Musculoskeletal: No significant peripheral atrophy  Psychiatric: Affect and mood appropriate      LABS:                        14.0   4.7   )-----------( 183      ( 2019 07:13 )             42.0           143  |  103  |  15.0  ----------------------------<  83  3.5   |  26.0  |  1.15    Ca    8.7      2019 07:13  Phos  3.8       Mg     2.0         TPro  6.5<L>  /  Alb  3.8  /  TBili  1.8  /  DBili  0.3  /  AST  134<H>  /  ALT  203<H>  /  AlkPhos  67      LIVER FUNCTIONS - ( 2019 07:13 )  Alb: 3.8 g/dL / Pro: 6.5 g/dL / ALK PHOS: 67 U/L / ALT: 203 U/L / AST: 134 U/L / GGT: x           Urinalysis Basic - ( 2019 18:19 )    Color: Yellow / Appearance: Clear / S.015 / pH: x  Gluc: x / Ketone: Negative  / Bili: Small / Urobili: 4 mg/dL   Blood: x / Protein: 15 mg/dL / Nitrite: Negative   Leuk Esterase: Trace / RBC: 3-5 /HPF / WBC 0-2   Sq Epi: x / Non Sq Epi: Occasional / Bacteria: Few      IMAGING:  < from: US Gallbladder (19 @ 18:39) >  FINDINGS:     GALLBLADDER: Cholelithiasis and gallbladder sludge without wall   thickening or pericholecystic fluid. Clinical Equivocal sonographic   Turner's sign.   CBD: Mildly dilated, 7 mm.     IMPRESSION:  Cholelithiasis and gallbladder sludge withoutwall   thickening or pericholecystic fluid. Clinical Equivocal sonographic   Turner's sign. Mildly dilated common bile duct. If there is further   clinical concern for acute cholecystitis a HIDA scan is suggested for   further evaluation.    < end of copied text >    < from: CT Abdomen and Pelvis w/ Oral Cont and w/ IV Cont (19 @ 14:14) >    FINDINGS:    LOWER CHEST: Cardiomegaly. Pacemaker wires.    ABDOMEN AND PELVIS:    LIVER: Hepatic steatosis. Subcentimeter hypodensity in the right hepatic   lobe too small to characterize.  BILE DUCTS: normal caliber.  GALLBLADDER: Gallstones.  PANCREAS: within normal limits.  SPLEEN: within normal limits.    ADRENALS: within normal limits.  KIDNEYS, URETERS AND BLADDER: within normal limits.    REPRODUCTIVE ORGANS: No pelvic masses. No free fluid.No pelvic   lymphadenopathy.    BOWEL: Diffuse colonic diverticulosis. Minimal peridiverticular stranding   in the distal descending colon/proximal sigmoid colon compatible with   very mild acute diverticulitis. No bowel obstruction. Normal appendix.    PERITONEUM: no ascites or free air, no fluid collection.    VESSELS:     .Within normal limits.  RETROPERITONEUM:     Within normal limits.      ABDOMINAL WALL: within normal limits.  BONES: Degenerative changes.    IMPRESSION:      Very mild diverticulitis involving the distal descending colon/proximal   sigmoid colon.    < end of copied text > HISTORY OF PRESENT ILLNESS:  This is a 48y old Male with a past medical history significant for tetralogy of fallot, Open heart surgeries (6 months, 6yr old) Pacemaker, stent placement and valve repair kiara 5yrs ago pw abdominal pain x 1 day. Patient had severe epigastric pain radiating ot RUQ and right back. Pain rated 10/10 and described as sharp, constant yesterday. He had nausea and vomiting. He had chills. The pain has since improved. LFTs trended down.     REVIEW OF SYSTEMS:  Constitutional:  No unintentional weight loss, fevers, chills or night sweats	  Eyes: No eye pain, redness, discharge, or proptosis  ENMT: No sore throat, ear pain, mouth sores, or swollen glands in the neck  Respiratory: No dyspnea, cough or wheezing  Cardiovascular: No chest pain, dyspnea on exertion, or orthopnea  Gastrointestinal:	Please see HPI  Genitourinary: No dysuria or hematuria  Neurological:	 No changes in sleep/wake cycle, convulsions, confusion, dizziness or lightheadedness  Psychiatric: No changes in personality or emotional problems   Hematology: No easy bruising   Endocrine: No hot or cold flashes or deepening of voice	  All other review of systems were completed and were otherwise negative save what is reported in the HPI.    PAST MEDICAL/SURGICAL HISTORY:  Tetralogy of Fallot  Diverticulitis  History of heart valve replacement  History of pacemaker    SOCIAL HISTORY:  - TOBACCO:  - ALCOHOL:  - ILLICIT DRUG USE: Denies    FAMILY HISTORY:  No known history of gastrointestinal or liver disease;      HOME MEDICATIONS:    INPATIENT MEDICATIONS:  MEDICATIONS  (STANDING):  cefoTEtan  IVPB      cefoTEtan  IVPB 2 Gram(s) IV Intermittent every 12 hours  enoxaparin Injectable 40 milliGRAM(s) SubCutaneous daily  lactated ringers. 1000 milliLiter(s) (75 mL/Hr) IV Continuous <Continuous>    MEDICATIONS  (PRN):  morphine  - Injectable 1 milliGRAM(s) IV Push every 4 hours PRN Severe Pain (7 - 10)  ondansetron Injectable 4 milliGRAM(s) IV Push every 4 hours PRN Nausea and/or Vomiting    ALLERGIES:  Itchy throat, Cats (Other)  No Known Drug Allergies    VITAL SIGNS LAST 24 HOURS:  T(C): 38.1 (2019 15:37), Max: 38.1 (2019 15:37)  T(F): 100.6 (2019 15:37), Max: 100.6 (2019 15:37)  HR: 60 (2019 15:37) (58 - 68)  BP: 99/63 (2019 15:37) (95/59 - 136/68)  BP(mean): --  RR: 18 (2019 15:37) (18 - 18)  SpO2: 91% (2019 15:37) (91% - 98%)      19 @ 07:01  -  19 @ 07:00  --------------------------------------------------------  IN: 525 mL / OUT: 325 mL / NET: 200 mL        19 @ 07:01  -  19 @ 07:00  --------------------------------------------------------  IN: 525 mL / OUT: 325 mL / NET: 200 mL    PHYSICAL EXAM:  Constitutional: Well-developed, well-nourished, in no apparent distress  Eyes: Sclerae anicteric, conjunctivae normal  ENMT: Mucus membranes moist, no oropharyngeal thrush noted  Neck: No thyroid nodules appreciated, no significant cervical or supraclavicular lymphadenopathy  Respiratory: Breathing nonlabored; clear to auscultation  Cardiovascular: Regular rate and rhythm  Gastrointestinal: Soft, ruq tender, nondistended, normoactive bowel sounds  Extremities: No clubbing, cyanosis or edema  Neurological: Alert and oriented to person, place and time; no asterixis  Skin: No jaundice  Lymph Nodes: No significant lymphadenopathy  Musculoskeletal: No significant peripheral atrophy  Psychiatric: Affect and mood appropriate      LABS:                        14.0   4.7   )-----------( 183      ( 2019 07:13 )             42.0           143  |  103  |  15.0  ----------------------------<  83  3.5   |  26.0  |  1.15    Ca    8.7      2019 07:13  Phos  3.8     -  Mg     2.0         TPro  6.5<L>  /  Alb  3.8  /  TBili  1.8  /  DBili  0.3  /  AST  134<H>  /  ALT  203<H>  /  AlkPhos  67      LIVER FUNCTIONS - ( 2019 07:13 )  Alb: 3.8 g/dL / Pro: 6.5 g/dL / ALK PHOS: 67 U/L / ALT: 203 U/L / AST: 134 U/L / GGT: x           Urinalysis Basic - ( 2019 18:19 )    Color: Yellow / Appearance: Clear / S.015 / pH: x  Gluc: x / Ketone: Negative  / Bili: Small / Urobili: 4 mg/dL   Blood: x / Protein: 15 mg/dL / Nitrite: Negative   Leuk Esterase: Trace / RBC: 3-5 /HPF / WBC 0-2   Sq Epi: x / Non Sq Epi: Occasional / Bacteria: Few      IMAGING:  < from: US Gallbladder (19 @ 18:39) >  FINDINGS:     GALLBLADDER: Cholelithiasis and gallbladder sludge without wall   thickening or pericholecystic fluid. Clinical Equivocal sonographic   Turner's sign.   CBD: Mildly dilated, 7 mm.     IMPRESSION:  Cholelithiasis and gallbladder sludge withoutwall   thickening or pericholecystic fluid. Clinical Equivocal sonographic   Turner's sign. Mildly dilated common bile duct. If there is further   clinical concern for acute cholecystitis a HIDA scan is suggested for   further evaluation.    < end of copied text >    < from: CT Abdomen and Pelvis w/ Oral Cont and w/ IV Cont (19 @ 14:14) >    FINDINGS:    LOWER CHEST: Cardiomegaly. Pacemaker wires.    ABDOMEN AND PELVIS:    LIVER: Hepatic steatosis. Subcentimeter hypodensity in the right hepatic   lobe too small to characterize.  BILE DUCTS: normal caliber.  GALLBLADDER: Gallstones.  PANCREAS: within normal limits.  SPLEEN: within normal limits.    ADRENALS: within normal limits.  KIDNEYS, URETERS AND BLADDER: within normal limits.    REPRODUCTIVE ORGANS: No pelvic masses. No free fluid.No pelvic   lymphadenopathy.    BOWEL: Diffuse colonic diverticulosis. Minimal peridiverticular stranding   in the distal descending colon/proximal sigmoid colon compatible with   very mild acute diverticulitis. No bowel obstruction. Normal appendix.    PERITONEUM: no ascites or free air, no fluid collection.    VESSELS:     .Within normal limits.  RETROPERITONEUM:     Within normal limits.      ABDOMINAL WALL: within normal limits.  BONES: Degenerative changes.    IMPRESSION:      Very mild diverticulitis involving the distal descending colon/proximal   sigmoid colon.    < end of copied text >

## 2019-04-29 ENCOUNTER — TRANSCRIPTION ENCOUNTER (OUTPATIENT)
Age: 49
End: 2019-04-29

## 2019-04-29 DIAGNOSIS — R94.5 ABNORMAL RESULTS OF LIVER FUNCTION STUDIES: ICD-10-CM

## 2019-04-29 DIAGNOSIS — I44.2 ATRIOVENTRICULAR BLOCK, COMPLETE: ICD-10-CM

## 2019-04-29 DIAGNOSIS — Q21.3 TETRALOGY OF FALLOT: ICD-10-CM

## 2019-04-29 LAB
ABO RH CONFIRMATION: SIGNIFICANT CHANGE UP
ALBUMIN SERPL ELPH-MCNC: 3.7 G/DL — SIGNIFICANT CHANGE UP (ref 3.3–5.2)
ALP SERPL-CCNC: 71 U/L — SIGNIFICANT CHANGE UP (ref 40–120)
ALT FLD-CCNC: 183 U/L — HIGH
ANION GAP SERPL CALC-SCNC: 15 MMOL/L — SIGNIFICANT CHANGE UP (ref 5–17)
AST SERPL-CCNC: 87 U/L — HIGH
BASOPHILS # BLD AUTO: 0 K/UL — SIGNIFICANT CHANGE UP (ref 0–0.2)
BASOPHILS NFR BLD AUTO: 0.2 % — SIGNIFICANT CHANGE UP (ref 0–2)
BILIRUB SERPL-MCNC: 2 MG/DL — SIGNIFICANT CHANGE UP (ref 0.4–2)
BLD GP AB SCN SERPL QL: SIGNIFICANT CHANGE UP
BUN SERPL-MCNC: 15 MG/DL — SIGNIFICANT CHANGE UP (ref 8–20)
CALCIUM SERPL-MCNC: 8.9 MG/DL — SIGNIFICANT CHANGE UP (ref 8.6–10.2)
CHLORIDE SERPL-SCNC: 101 MMOL/L — SIGNIFICANT CHANGE UP (ref 98–107)
CO2 SERPL-SCNC: 25 MMOL/L — SIGNIFICANT CHANGE UP (ref 22–29)
CREAT SERPL-MCNC: 1.01 MG/DL — SIGNIFICANT CHANGE UP (ref 0.5–1.3)
CULTURE RESULTS: NO GROWTH — SIGNIFICANT CHANGE UP
EOSINOPHIL # BLD AUTO: 0.1 K/UL — SIGNIFICANT CHANGE UP (ref 0–0.5)
EOSINOPHIL NFR BLD AUTO: 1.2 % — SIGNIFICANT CHANGE UP (ref 0–5)
GLUCOSE SERPL-MCNC: 68 MG/DL — LOW (ref 70–115)
HCT VFR BLD CALC: 42.8 % — SIGNIFICANT CHANGE UP (ref 42–52)
HGB BLD-MCNC: 14.3 G/DL — SIGNIFICANT CHANGE UP (ref 14–18)
LYMPHOCYTES # BLD AUTO: 1.1 K/UL — SIGNIFICANT CHANGE UP (ref 1–4.8)
LYMPHOCYTES # BLD AUTO: 19.4 % — LOW (ref 20–55)
MAGNESIUM SERPL-MCNC: 1.8 MG/DL — SIGNIFICANT CHANGE UP (ref 1.6–2.6)
MCHC RBC-ENTMCNC: 29.3 PG — SIGNIFICANT CHANGE UP (ref 27–31)
MCHC RBC-ENTMCNC: 33.4 G/DL — SIGNIFICANT CHANGE UP (ref 32–36)
MCV RBC AUTO: 87.7 FL — SIGNIFICANT CHANGE UP (ref 80–94)
MONOCYTES # BLD AUTO: 0.4 K/UL — SIGNIFICANT CHANGE UP (ref 0–0.8)
MONOCYTES NFR BLD AUTO: 6.6 % — SIGNIFICANT CHANGE UP (ref 3–10)
NEUTROPHILS # BLD AUTO: 4.1 K/UL — SIGNIFICANT CHANGE UP (ref 1.8–8)
NEUTROPHILS NFR BLD AUTO: 72.4 % — SIGNIFICANT CHANGE UP (ref 37–73)
PHOSPHATE SERPL-MCNC: 3.2 MG/DL — SIGNIFICANT CHANGE UP (ref 2.4–4.7)
PLATELET # BLD AUTO: 177 K/UL — SIGNIFICANT CHANGE UP (ref 150–400)
POTASSIUM SERPL-MCNC: 3.9 MMOL/L — SIGNIFICANT CHANGE UP (ref 3.5–5.3)
POTASSIUM SERPL-SCNC: 3.9 MMOL/L — SIGNIFICANT CHANGE UP (ref 3.5–5.3)
PROT SERPL-MCNC: 6.8 G/DL — SIGNIFICANT CHANGE UP (ref 6.6–8.7)
RBC # BLD: 4.88 M/UL — SIGNIFICANT CHANGE UP (ref 4.6–6.2)
RBC # FLD: 13.8 % — SIGNIFICANT CHANGE UP (ref 11–15.6)
SODIUM SERPL-SCNC: 141 MMOL/L — SIGNIFICANT CHANGE UP (ref 135–145)
SPECIMEN SOURCE: SIGNIFICANT CHANGE UP
TYPE + AB SCN PNL BLD: SIGNIFICANT CHANGE UP
WBC # BLD: 5.6 K/UL — SIGNIFICANT CHANGE UP (ref 4.8–10.8)
WBC # FLD AUTO: 5.6 K/UL — SIGNIFICANT CHANGE UP (ref 4.8–10.8)

## 2019-04-29 PROCEDURE — 99233 SBSQ HOSP IP/OBS HIGH 50: CPT

## 2019-04-29 PROCEDURE — 99231 SBSQ HOSP IP/OBS SF/LOW 25: CPT

## 2019-04-29 PROCEDURE — 93306 TTE W/DOPPLER COMPLETE: CPT | Mod: 26

## 2019-04-29 RX ORDER — MAGNESIUM SULFATE 500 MG/ML
2 VIAL (ML) INJECTION ONCE
Qty: 0 | Refills: 0 | Status: COMPLETED | OUTPATIENT
Start: 2019-04-29 | End: 2019-04-29

## 2019-04-29 RX ORDER — HYDROCHLOROTHIAZIDE 25 MG
25 TABLET ORAL DAILY
Qty: 0 | Refills: 0 | Status: DISCONTINUED | OUTPATIENT
Start: 2019-04-29 | End: 2019-05-01

## 2019-04-29 RX ORDER — HYDROMORPHONE HYDROCHLORIDE 2 MG/ML
1 INJECTION INTRAMUSCULAR; INTRAVENOUS; SUBCUTANEOUS EVERY 4 HOURS
Qty: 0 | Refills: 0 | Status: DISCONTINUED | OUTPATIENT
Start: 2019-04-29 | End: 2019-05-01

## 2019-04-29 RX ORDER — METOPROLOL TARTRATE 50 MG
25 TABLET ORAL
Qty: 0 | Refills: 0 | Status: DISCONTINUED | OUTPATIENT
Start: 2019-04-29 | End: 2019-05-01

## 2019-04-29 RX ORDER — LOSARTAN POTASSIUM 100 MG/1
100 TABLET, FILM COATED ORAL DAILY
Qty: 0 | Refills: 0 | Status: DISCONTINUED | OUTPATIENT
Start: 2019-04-29 | End: 2019-05-01

## 2019-04-29 RX ORDER — HYDROMORPHONE HYDROCHLORIDE 2 MG/ML
0.5 INJECTION INTRAMUSCULAR; INTRAVENOUS; SUBCUTANEOUS EVERY 4 HOURS
Qty: 0 | Refills: 0 | Status: DISCONTINUED | OUTPATIENT
Start: 2019-04-29 | End: 2019-05-01

## 2019-04-29 RX ADMIN — HYDROMORPHONE HYDROCHLORIDE 0.5 MILLIGRAM(S): 2 INJECTION INTRAMUSCULAR; INTRAVENOUS; SUBCUTANEOUS at 11:09

## 2019-04-29 RX ADMIN — HYDROMORPHONE HYDROCHLORIDE 0.5 MILLIGRAM(S): 2 INJECTION INTRAMUSCULAR; INTRAVENOUS; SUBCUTANEOUS at 11:39

## 2019-04-29 RX ADMIN — ENOXAPARIN SODIUM 40 MILLIGRAM(S): 100 INJECTION SUBCUTANEOUS at 11:09

## 2019-04-29 RX ADMIN — ONDANSETRON 4 MILLIGRAM(S): 8 TABLET, FILM COATED ORAL at 21:06

## 2019-04-29 RX ADMIN — SODIUM CHLORIDE 75 MILLILITER(S): 9 INJECTION, SOLUTION INTRAVENOUS at 01:59

## 2019-04-29 RX ADMIN — Medication 100 GRAM(S): at 05:22

## 2019-04-29 RX ADMIN — Medication 50 GRAM(S): at 06:10

## 2019-04-29 RX ADMIN — SODIUM CHLORIDE 75 MILLILITER(S): 9 INJECTION, SOLUTION INTRAVENOUS at 20:27

## 2019-04-29 RX ADMIN — HYDROMORPHONE HYDROCHLORIDE 0.5 MILLIGRAM(S): 2 INJECTION INTRAMUSCULAR; INTRAVENOUS; SUBCUTANEOUS at 21:07

## 2019-04-29 RX ADMIN — HYDROMORPHONE HYDROCHLORIDE 0.5 MILLIGRAM(S): 2 INJECTION INTRAMUSCULAR; INTRAVENOUS; SUBCUTANEOUS at 21:22

## 2019-04-29 RX ADMIN — ONDANSETRON 4 MILLIGRAM(S): 8 TABLET, FILM COATED ORAL at 11:09

## 2019-04-29 RX ADMIN — Medication 100 GRAM(S): at 20:26

## 2019-04-29 NOTE — PROGRESS NOTE ADULT - SUBJECTIVE AND OBJECTIVE BOX
[facility]                                                        Office: [address] [address]                                                       Telephone: [phone]. Fax:[phone]      CARDIOLOGY PROGRESS NOTE   ([facility])    Subjective: Patient seen and examined.  Mild right sided upper abdominal pain.     ROS: No headache, no chest pain, no SOB, no palpitations, no dizziness, no nausea, no bleeding    Chronic Conditions:     CURRENT MEDICATIONS  hydrochlorothiazide 25 milliGRAM(s) Oral daily  losartan 100 milliGRAM(s) Oral daily  metoprolol tartrate 25 milliGRAM(s) Oral two times a day    cefoTEtan  IVPB      cefoTEtan  IVPB 2 Gram(s) IV Intermittent every 12 hours      HYDROmorphone  Injectable 0.5 milliGRAM(s) IV Push every 4 hours PRN  HYDROmorphone  Injectable 1 milliGRAM(s) IV Push every 4 hours PRN  ondansetron Injectable 4 milliGRAM(s) IV Push every 4 hours PRN        enoxaparin Injectable 40 milliGRAM(s) SubCutaneous daily  lactated ringers. 1000 milliLiter(s) IV Continuous <Continuous>    	  TELEMETRY:   Vitals:  T(C): 36.7 (04-29-19 @ 07:00), Max: 38.1 (04-28-19 @ 15:37)  HR: 59 (04-29-19 @ 07:00) (59 - 60)  BP: 135/75 (04-29-19 @ 07:00) (99/63 - 135/75)  RR: 18 (04-29-19 @ 07:00) (18 - 18)  SpO2: 95% (04-29-19 @ 07:00) (91% - 95%)  Wt(kg): --  I&O's Summary    28 Apr 2019 07:01  -  29 Apr 2019 07:00  --------------------------------------------------------  IN: 900 mL / OUT: 900 mL / NET: 0 mL        Daily T(C): 36.7 (04-29-19 @ 07:00), Max: 38.1 (04-28-19 @ 15:37)  HR: 59 (04-29-19 @ 07:00) (59 - 60)  BP: 135/75 (04-29-19 @ 07:00) (99/63 - 135/75)  RR: 18 (04-29-19 @ 07:00) (18 - 18)  SpO2: 95% (04-29-19 @ 07:00) (91% - 95%)  Wt(kg): --    Daily     PHYSICAL EXAM:  Appearance: Normal, NAD	  HEENT:   Normal oral mucosa, PERRL, EOMI	  Lymphatic: No lymphadenopathy  Cardiovascular: Normal S1 S2, No JVD, III/VI systolic murmur, II/VI diastolic murmur, No edema  Respiratory: Lungs clear to auscultation	  Psychiatry: A & O x 3, Mood & affect appropriate  Gastrointestinal:  Soft, Non-tender, + BS	  Skin: No rashes, No ecchymoses, No cyanosis  Neurologic: Non-focal  Extremities: Normal range of motion, No clubbing, cyanosis or edema  Vascular: Peripheral pulses palpable 2+ bilaterally, warm      ECG (tracing reviewed by me):  	    DIAGNOSTIC TESTING:  Echocardiogram (images reviewed by me): reviewed while it was being performed.  Tetralogy of Fallot s/p repair - no residual VSD, bioprosthetic PV, with elevated gradients, mild PI, RV mildly enlarged with low normal systolic function.  LV systolic function normal.  Pending formal report.                14.3   5.6   )-----------( 177      ( 29 Apr 2019 04:27 )             42.8     04-29    141  |  101  |  15.0  ----------------------------<  68<L>  3.9   |  25.0  |  1.01    Ca    8.9      29 Apr 2019 04:27  Phos  3.2     04-29  Mg     1.8     04-29    TPro  6.8  /  Alb  3.7  /  TBili  2.0  /  DBili  x   /  AST  87<H>  /  ALT  183<H>  /  AlkPhos  71  04-29

## 2019-04-29 NOTE — PROGRESS NOTE ADULT - SUBJECTIVE AND OBJECTIVE BOX
doing well this morning reports no abd pain  awaiting echo     MEDICATIONS  (STANDING):  cefoTEtan  IVPB      cefoTEtan  IVPB 2 Gram(s) IV Intermittent every 12 hours  enoxaparin Injectable 40 milliGRAM(s) SubCutaneous daily  hydrochlorothiazide 25 milliGRAM(s) Oral daily  lactated ringers. 1000 milliLiter(s) (75 mL/Hr) IV Continuous <Continuous>  losartan 100 milliGRAM(s) Oral daily  metoprolol tartrate 25 milliGRAM(s) Oral two times a day    MEDICATIONS  (PRN):  HYDROmorphone  Injectable 0.5 milliGRAM(s) IV Push every 4 hours PRN Moderate Pain (4 - 6)  HYDROmorphone  Injectable 1 milliGRAM(s) IV Push every 4 hours PRN Severe Pain (7 - 10)  ondansetron Injectable 4 milliGRAM(s) IV Push every 4 hours PRN Nausea and/or Vomiting      Vital Signs Last 24 Hrs  T(C): 36.7 (2019 07:00), Max: 38.1 (2019 15:37)  T(F): 98.1 (2019 07:00), Max: 100.6 (2019 15:37)  HR: 59 (2019 07:00) (59 - 60)  BP: 135/75 (2019 07:00) (99/63 - 135/75)  BP(mean): --  RR: 18 (2019 07:00) (18 - 18)  SpO2: 95% (2019 07:00) (91% - 95%)    PE  Gen: nad anicteric   Pulm: ctabl  CV: s1s2  Abd: s nt nd   Ext: no edema, <2sec cap refill      I&O's Detail    2019 07:01  -  2019 07:00  --------------------------------------------------------  IN:    lactated ringers.: 900 mL  Total IN: 900 mL    OUT:    Voided: 900 mL  Total OUT: 900 mL    Total NET: 0 mL          LABS:                        14.3   5.6   )-----------( 177      ( 2019 04:27 )             42.8         141  |  101  |  15.0  ----------------------------<  68<L>  3.9   |  25.0  |  1.01    Ca    8.9      2019 04:27  Phos  3.2       Mg     1.8         TPro  6.8  /  Alb  3.7  /  TBili  2.0  /  DBili  x   /  AST  87<H>  /  ALT  183<H>  /  AlkPhos  71        Urinalysis Basic - ( 2019 18:19 )    Color: Yellow / Appearance: Clear / S.015 / pH: x  Gluc: x / Ketone: Negative  / Bili: Small / Urobili: 4 mg/dL   Blood: x / Protein: 15 mg/dL / Nitrite: Negative   Leuk Esterase: Trace / RBC: 3-5 /HPF / WBC 0-2   Sq Epi: x / Non Sq Epi: Occasional / Bacteria: Few        RADIOLOGY & ADDITIONAL STUDIES:

## 2019-04-29 NOTE — PROGRESS NOTE ADULT - SUBJECTIVE AND OBJECTIVE BOX
Patient is a 48y old  Male who presents with a chief complaint of Symptomatic cholelithiasis r/u choledocholithiasis (29 Apr 2019 10:37)      HPI:  48 year old male presenting with chief complaint of abdominal pain. Pain  better. No nausea and vomiting . NO fevers. T max 100.6.  LFT slightly better.         PMHx: Tetralogy of Fallot - Cardiologist Dr. Akil Navarrete @ [facility]   PSHx: Open heart surgeries (6 months, 6yr old) Pacemaker, stent placement and valve repair kiara 5yrs ago - CTS Dr. Soriano @ [facility]    Allergies: None   Meds: Baby ASA, Metoprolol 25mg BID, losartan-HCTZ 100-25mg QD, started taking xarelto after cardiac stent placement and stopped it 2-3yrs ago   FamHx: Lung CA in maternal grandmother, bladder CA in father, pancreatic CA in maternal aunt   SocHx: Former social smoker (high school and college) denies EtOH and IV drug use (27 Apr 2019 21:30)      REVIEW OF SYSTEMS:  Constitutional: No fever, weight loss or fatigue  ENMT:  No difficulty hearing, tinnitus, vertigo; No sinus or throat pain  Respiratory: No cough, wheezing, chills or hemoptysis  Cardiovascular: No chest pain, palpitations, dizziness or leg swelling  Gastrointestinal: No abdominal or epigastric pain. No nausea, vomiting or hematemesis; No diarrhea or constipation. No melena or hematochezia.  Skin: No itching, burning, rashes or lesions   Musculoskeletal: No joint pain or swelling; No muscle, back or extremity pain    PAST MEDICAL & SURGICAL HISTORY:  Tetralogy of Fallot  Diverticulitis  History of heart valve replacement  History of pacemaker      FAMILY HISTORY:      SOCIAL HISTORY:  Smoking Status: [ ] Current, [ ] Former, [ ] Never  Pack Years:  [  ] EtOH-no  [  ] IVDA-no    MEDICATIONS:  MEDICATIONS  (STANDING):  cefoTEtan  IVPB      cefoTEtan  IVPB 2 Gram(s) IV Intermittent every 12 hours  enoxaparin Injectable 40 milliGRAM(s) SubCutaneous daily  hydrochlorothiazide 25 milliGRAM(s) Oral daily  lactated ringers. 1000 milliLiter(s) (75 mL/Hr) IV Continuous <Continuous>  losartan 100 milliGRAM(s) Oral daily  metoprolol tartrate 25 milliGRAM(s) Oral two times a day    MEDICATIONS  (PRN):  HYDROmorphone  Injectable 0.5 milliGRAM(s) IV Push every 4 hours PRN Moderate Pain (4 - 6)  HYDROmorphone  Injectable 1 milliGRAM(s) IV Push every 4 hours PRN Severe Pain (7 - 10)  ondansetron Injectable 4 milliGRAM(s) IV Push every 4 hours PRN Nausea and/or Vomiting      Allergies    Itchy throat, Cats (Other)  No Known Drug Allergies    Intolerances        Vital Signs Last 24 Hrs  T(C): 36.7 (29 Apr 2019 07:00), Max: 38.1 (28 Apr 2019 15:37)  T(F): 98.1 (29 Apr 2019 07:00), Max: 100.6 (28 Apr 2019 15:37)  HR: 59 (29 Apr 2019 07:00) (59 - 60)  BP: 135/75 (29 Apr 2019 07:00) (99/63 - 135/75)  BP(mean): --  RR: 18 (29 Apr 2019 07:00) (18 - 18)  SpO2: 95% (29 Apr 2019 07:00) (91% - 95%)    04-28 @ 07:01  -  04-29 @ 07:00  --------------------------------------------------------  IN: 900 mL / OUT: 900 mL / NET: 0 mL          PHYSICAL EXAM:    General: Well developed; well nourished; in no acute distress  HEENT: MMM, conjunctiva and sclera clear  H- RRR  L- CTA  Gastrointestinal: Soft, non-tender non-distended; Normal bowel sounds; No rebound or guarding  Extremities: Normal range of motion, No clubbing, cyanosis or edema  Neurological: Alert and oriented x3  Skin: Warm and dry. No obvious rash      LABS:                        14.3   5.6   )-----------( 177      ( 29 Apr 2019 04:27 )             42.8     29 Apr 2019 04:27    141    |  101    |  15.0   ----------------------------<  68     3.9     |  25.0   |  1.01     Ca    8.9        29 Apr 2019 04:27  Phos  3.2       29 Apr 2019 04:27  Mg     1.8       29 Apr 2019 04:27    TPro  6.8    /  Alb  3.7    /  TBili  2.0    /  DBili  x      /  AST  87     /  ALT  183    /  AlkPhos  71     / Amylase x      /Lipase x      29 Apr 2019 04:27        Hepatitis B Core IgM Antibody: Nonreact (04-28-19 @ 14:10)  Hepatitis C Virus Interpretation: Nonreact (04-28-19 @ 14:10)  Hepatitis B Surface Antigen: Nonreact (04-28-19 @ 14:10)  Hepatitis C Virus S/CO Ratio: 0.71 S/CO (04-28-19 @ 14:10)        RADIOLOGY & ADDITIONAL STUDIES:

## 2019-04-29 NOTE — PROGRESS NOTE ADULT - SUBJECTIVE AND OBJECTIVE BOX
The patient is a 48y old male who presents with a complaint of Symptomatic cholelithiasis.  He   is scheduled to have an EUS.  On Saturday he had severe pain in the right upper quadrant with   nausea and vomiting       (29 Apr 2019 11:37)      PAST MEDICAL HISTORY:   L.V.E.F. = 55 to 60% by TTE 4/28/2019 with Grade 2 Diastolic Dysfunction & severe pulmonary       hypertension.   Congenital heart disease -Tetralogy of Fallot   Diverticulitis      PAST SURGICAL HISTORY:  Tetralogy of Fallot repair at 5 months & 7 year old  History of aortic heart valve replacement with a bovine valve in 2013   History of pacemaker - defibrillator in 2013 after a-fib      MEDICATIONS  (STANDING):  cefoTEtan  IVPB      cefoTEtan  IVPB 2 Gram(s) IV Intermittent every 12 hours  hydrochlorothiazide 25 milliGRAM(s) Oral daily  lactated ringers. 1000 milliLiter(s) (75 mL/Hr) IV Continuous <Continuous>  losartan 100 milliGRAM(s) Oral daily  metoprolol tartrate 25 milliGRAM(s) Oral two times a day    MEDICATIONS  (PRN):  HYDROmorphone  Injectable 0.5 milliGRAM(s) IV Push every 4 hours PRN Moderate Pain (4 - 6)  HYDROmorphone  Injectable 1 milliGRAM(s) IV Push every 4 hours PRN Severe Pain (7 - 10)  ondansetron Injectable 4 milliGRAM(s) IV Push every 4 hours PRN Nausea and/or Vomiting      Allergies:     No known drug allergies                     Itchy throat, Cats (Other)    SOCIAL HISTORY:  -  He drinks wine occasionally, roughly 2 times per month.                                   He smoked while in college then stopped.  He never used illicit drugs.                        14.3   5.6   )-----------( 177      ( 29 Apr 2019 04:27 )             42.8     04-29    141  |  101  |  15.0  ----------------------------<  68<L>  3.9   |  25.0  |  1.01    Ca    8.9      29 Apr 2019 04:27  Phos  3.2     04-29  Mg     1.8     04-29    TPro  6.8  /  Alb  3.7  /  TBili  2.0  /  DBili  x   /  AST  87<H>  /  ALT  183<H>  /  AlkPhos  71  04-29    EKG:  -  4/28/2019   AV dual-paced complexes    TT ECHO:  -  4/29/2019  Summary:   2. Normal global left ventricular systolic function.   3. Left ventricular ejection fraction, by visual estimation, is 55 to   60%.   4. Spectral Doppler shows pseudonormal pattern of left ventricular   myocardial filling (Grade II diastolic dysfunction).   7. Estimated pulmonary artery systolic pressure is 64.0 mmHg assuming a   right atrial pressure of 8 mmHg, which is consistent with severe   pulmonary hypertension.  10. S/P pulmonic valve replacement. The Doppler velocity index is 0.16,   suggestive of severe pulmonic valve stenosis.     CT ABDOMEN & PELVIS  -  2/13/2019  BOWEL: Diffuse colonic diverticulosis. Minimal peridiverticular stranding   in the distal descending colon/proximal sigmoid colon compatible with   very mild acute diverticulitis. No bowel obstruction.     ASA # = 3  Mallampati # = 3 The patient is a 48y old male who presents with a complaint of Symptomatic cholelithiasis.  He   is scheduled to have an EUS.  On Saturday he had severe pain in the right upper quadrant with   nausea and vomiting.  After the EUS the patient is scheduled to have a LAPAROSCOPIC   CHOLECYSTECTOMY AND CORE NEEDLE LIVER BIOPSY.       (29 Apr 2019 11:37)      PAST MEDICAL HISTORY:   L.V.E.F. = 55 to 60% by TTE 4/28/2019 with Grade 2 Diastolic Dysfunction & severe pulmonary       hypertension.   Congenital heart disease -Tetralogy of Fallot   Diverticulitis      PAST SURGICAL HISTORY:  Tetralogy of Fallot repair at 5 months & 6 year old  History of aortic heart valve replacement with a bovine valve in 2013   History of pacemaker - defibrillator in 2013 after a-fib      MEDICATIONS  (STANDING):  cefoTEtan  IVPB      cefoTEtan  IVPB 2 Gram(s) IV Intermittent every 12 hours  hydrochlorothiazide 25 milliGRAM(s) Oral daily  lactated ringers. 1000 milliLiter(s) (75 mL/Hr) IV Continuous <Continuous>  losartan 100 milliGRAM(s) Oral daily  metoprolol tartrate 25 milliGRAM(s) Oral two times a day    MEDICATIONS  (PRN):  HYDROmorphone  Injectable 0.5 milliGRAM(s) IV Push every 4 hours PRN Moderate Pain (4 - 6)  HYDROmorphone  Injectable 1 milliGRAM(s) IV Push every 4 hours PRN Severe Pain (7 - 10)  ondansetron Injectable 4 milliGRAM(s) IV Push every 4 hours PRN Nausea and/or Vomiting      Allergies:     No known drug allergies                     Itchy throat, Cats (Other)    SOCIAL HISTORY:  -  He drinks wine occasionally, roughly 2 times per month.                                   He smoked while in college then stopped.  He never used illicit drugs.                        14.3   5.6   )-----------( 177      ( 29 Apr 2019 04:27 )             42.8     04-29    141  |  101  |  15.0  ----------------------------<  68<L>  3.9   |  25.0  |  1.01    Ca    8.9      29 Apr 2019 04:27  Phos  3.2     04-29  Mg     1.8     04-29    TPro  6.8  /  Alb  3.7  /  TBili  2.0  /  DBili  x   /  AST  87<H>  /  ALT  183<H>  /  AlkPhos  71  04-29    EKG:  -  4/28/2019   AV dual-paced complexes    TT ECHO:  -  4/29/2019  Summary:   2. Normal global left ventricular systolic function.   3. Left ventricular ejection fraction, by visual estimation, is 55 to   60%.   4. Spectral Doppler shows pseudonormal pattern of left ventricular   myocardial filling (Grade II diastolic dysfunction).   7. Estimated pulmonary artery systolic pressure is 64.0 mmHg assuming a   right atrial pressure of 8 mmHg, which is consistent with severe   pulmonary hypertension.  10. S/P pulmonic valve replacement. The Doppler velocity index is 0.16,   suggestive of severe pulmonic valve stenosis.     CT ABDOMEN & PELVIS  -  2/13/2019  BOWEL: Diffuse colonic diverticulosis. Minimal peridiverticular stranding   in the distal descending colon/proximal sigmoid colon compatible with   very mild acute diverticulitis. No bowel obstruction.     ASA # = 3  Mallampati # = 3

## 2019-04-29 NOTE — PROGRESS NOTE ADULT - SUBJECTIVE AND OBJECTIVE BOX
SUBJECTIVE    pt feeling much better today, hungry    REVIEW OF SYSTEMS    General: Not in any pain	    Skin/Breast: No rash  	  ENMT: No visual problems, no sore throat	    Respiratory and Thorax: No cough, No CP, No SOB  	  Cardiovascular: No CP, No palpitations    Gastrointestinal: No Abd pain, No N/V/D    Musculoskeletal: No Joint pain, No back pain	    Neurological: No headache    Psychiatric: No anxiety      OBJECTIVE    Vital Signs Last 24 Hrs  T(C): 36.7 (04-29-19 @ 20:23), Max: 36.9 (04-28-19 @ 23:39)  T(F): 98.1 (04-29-19 @ 20:23), Max: 98.4 (04-28-19 @ 23:39)  HR: 59 (04-29-19 @ 20:23) (59 - 60)  BP: 95/63 (04-29-19 @ 20:23) (95/63 - 135/75)  BP(mean): --  RR: 18 (04-29-19 @ 20:23) (18 - 18)  SpO2: 96% (04-29-19 @ 20:23) (92% - 98%)    PHYSICAL EXAM:    Constitutional: Not in any distress    Eyes: No conjunctival injection    ENMT: No oral lesions    Neck: No nodes, no adenopathy    Back: Straight, no defects    Respiratory: clear b/l    Cardiovascular: RRR, no murmur    Gastrointestinal: soft, NT, ND    Extremities: No edema, no erythema    Neurological: no focal deficit    Skin: No rash      MEDICATIONS  (STANDING):  cefoTEtan  IVPB      cefoTEtan  IVPB 2 Gram(s) IV Intermittent every 12 hours  hydrochlorothiazide 25 milliGRAM(s) Oral daily  lactated ringers. 1000 milliLiter(s) (75 mL/Hr) IV Continuous <Continuous>  losartan 100 milliGRAM(s) Oral daily  metoprolol tartrate 25 milliGRAM(s) Oral two times a day    MEDICATIONS  (PRN):  HYDROmorphone  Injectable 0.5 milliGRAM(s) IV Push every 4 hours PRN Moderate Pain (4 - 6)  HYDROmorphone  Injectable 1 milliGRAM(s) IV Push every 4 hours PRN Severe Pain (7 - 10)  ondansetron Injectable 4 milliGRAM(s) IV Push every 4 hours PRN Nausea and/or Vomiting                              14.3   5.6   )-----------( 177      ( 29 Apr 2019 04:27 )             42.8     29 Apr 2019 04:27    141    |  101    |  15.0   ----------------------------<  68     3.9     |  25.0   |  1.01     Ca    8.9        29 Apr 2019 04:27  Phos  3.2       29 Apr 2019 04:27  Mg     1.8       29 Apr 2019 04:27    TPro  6.8    /  Alb  3.7    /  TBili  2.0    /  DBili  x      /  AST  87     /  ALT  183    /  AlkPhos  71     29 Apr 2019 04:27    Allergies    Itchy throat, Cats (Other)  No Known Drug Allergies    Intolerances

## 2019-04-29 NOTE — PROGRESS NOTE ADULT - SUBJECTIVE AND OBJECTIVE BOX
[facility]                                                        Office: [address] [address]                                                       Telephone: [phone]. Fax:[phone]      CARDIOLOGY PROGRESS NOTE   ([facility])    Subjective:    ROS: No headache, no chest pain, no SOB, no palpitations, no dizziness, no nausea, no bleeding    Chronic Conditions:     CURRENT MEDICATIONS  hydrochlorothiazide 25 milliGRAM(s) Oral daily  losartan 100 milliGRAM(s) Oral daily  metoprolol tartrate 25 milliGRAM(s) Oral two times a day    cefoTEtan  IVPB      cefoTEtan  IVPB 2 Gram(s) IV Intermittent every 12 hours      HYDROmorphone  Injectable 0.5 milliGRAM(s) IV Push every 4 hours PRN  HYDROmorphone  Injectable 1 milliGRAM(s) IV Push every 4 hours PRN  ondansetron Injectable 4 milliGRAM(s) IV Push every 4 hours PRN        enoxaparin Injectable 40 milliGRAM(s) SubCutaneous daily  lactated ringers. 1000 milliLiter(s) IV Continuous <Continuous>    	  TELEMETRY:   Vitals:  T(C): 36.7 (04-29-19 @ 07:00), Max: 38.1 (04-28-19 @ 15:37)  HR: 59 (04-29-19 @ 07:00) (59 - 60)  BP: 135/75 (04-29-19 @ 07:00) (99/63 - 135/75)  RR: 18 (04-29-19 @ 07:00) (18 - 18)  SpO2: 95% (04-29-19 @ 07:00) (91% - 95%)  Wt(kg): --  I&O's Summary    28 Apr 2019 07:01  -  29 Apr 2019 07:00  --------------------------------------------------------  IN: 900 mL / OUT: 900 mL / NET: 0 mL        Daily T(C): 36.7 (04-29-19 @ 07:00), Max: 38.1 (04-28-19 @ 15:37)  HR: 59 (04-29-19 @ 07:00) (59 - 60)  BP: 135/75 (04-29-19 @ 07:00) (99/63 - 135/75)  RR: 18 (04-29-19 @ 07:00) (18 - 18)  SpO2: 95% (04-29-19 @ 07:00) (91% - 95%)  Wt(kg): --    Daily     PHYSICAL EXAM:  Appearance: Normal	  HEENT:   Normal oral mucosa, PERRL, EOMI	  Lymphatic: No lymphadenopathy  Cardiovascular: Normal S1 S2, No JVD, No murmurs, No edema  Respiratory: Lungs clear to auscultation	  Psychiatry: A & O x 3, Mood & affect appropriate  Gastrointestinal:  Soft, Non-tender, + BS	  Skin: No rashes, No ecchymoses, No cyanosis  Neurologic: Non-focal  Extremities: Normal range of motion, No clubbing, cyanosis or edema  Vascular: Peripheral pulses palpable 2+ bilaterally, warm      ECG (tracing reviewed by me):  	    DIAGNOSTIC TESTING:  Echocardiogram (images reviewed by me):  Catheterization:  Stress Test:    CXR (image reviewed by me):  OTHER: 	    LABS:	 	  CARDIAC MARKERS:                                  14.3   5.6   )-----------( 177      ( 29 Apr 2019 04:27 )             42.8     04-29    141  |  101  |  15.0  ----------------------------<  68<L>  3.9   |  25.0  |  1.01    Ca    8.9      29 Apr 2019 04:27  Phos  3.2     04-29  Mg     1.8     04-29    TPro  6.8  /  Alb  3.7  /  TBili  2.0  /  DBili  x   /  AST  87<H>  /  ALT  183<H>  /  AlkPhos  71  04-29    BNP:   Lipid Profile:   HgA1c:   TSH:

## 2019-04-30 ENCOUNTER — RESULT REVIEW (OUTPATIENT)
Age: 49
End: 2019-04-30

## 2019-04-30 ENCOUNTER — TRANSCRIPTION ENCOUNTER (OUTPATIENT)
Age: 49
End: 2019-04-30

## 2019-04-30 DIAGNOSIS — K57.92 DIVERTICULITIS OF INTESTINE, PART UNSPECIFIED, WITHOUT PERFORATION OR ABSCESS WITHOUT BLEEDING: ICD-10-CM

## 2019-04-30 LAB
ALBUMIN SERPL ELPH-MCNC: 3.9 G/DL — SIGNIFICANT CHANGE UP (ref 3.3–5.2)
ALP SERPL-CCNC: 70 U/L — SIGNIFICANT CHANGE UP (ref 40–120)
ALT FLD-CCNC: 132 U/L — HIGH
ANION GAP SERPL CALC-SCNC: 16 MMOL/L — SIGNIFICANT CHANGE UP (ref 5–17)
APPEARANCE UR: CLEAR — SIGNIFICANT CHANGE UP
AST SERPL-CCNC: 50 U/L — HIGH
BILIRUB DIRECT SERPL-MCNC: 0.4 MG/DL — HIGH (ref 0–0.3)
BILIRUB SERPL-MCNC: 1.7 MG/DL — SIGNIFICANT CHANGE UP (ref 0.4–2)
BILIRUB UR-MCNC: NEGATIVE — SIGNIFICANT CHANGE UP
BUN SERPL-MCNC: 13 MG/DL — SIGNIFICANT CHANGE UP (ref 8–20)
CALCIUM SERPL-MCNC: 8.8 MG/DL — SIGNIFICANT CHANGE UP (ref 8.6–10.2)
CHLORIDE SERPL-SCNC: 98 MMOL/L — SIGNIFICANT CHANGE UP (ref 98–107)
CO2 SERPL-SCNC: 26 MMOL/L — SIGNIFICANT CHANGE UP (ref 22–29)
COLOR SPEC: YELLOW — SIGNIFICANT CHANGE UP
CREAT SERPL-MCNC: 0.92 MG/DL — SIGNIFICANT CHANGE UP (ref 0.5–1.3)
DIFF PNL FLD: NEGATIVE — SIGNIFICANT CHANGE UP
GLUCOSE SERPL-MCNC: 67 MG/DL — LOW (ref 70–115)
GLUCOSE UR QL: NEGATIVE MG/DL — SIGNIFICANT CHANGE UP
INR BLD: 1.09 RATIO — SIGNIFICANT CHANGE UP (ref 0.88–1.16)
KETONES UR-MCNC: ABNORMAL
LEUKOCYTE ESTERASE UR-ACNC: NEGATIVE — SIGNIFICANT CHANGE UP
MAGNESIUM SERPL-MCNC: 2.1 MG/DL — SIGNIFICANT CHANGE UP (ref 1.6–2.6)
NITRITE UR-MCNC: NEGATIVE — SIGNIFICANT CHANGE UP
PH UR: 7 — SIGNIFICANT CHANGE UP (ref 5–8)
POTASSIUM SERPL-MCNC: 4.1 MMOL/L — SIGNIFICANT CHANGE UP (ref 3.5–5.3)
POTASSIUM SERPL-SCNC: 4.1 MMOL/L — SIGNIFICANT CHANGE UP (ref 3.5–5.3)
PROT SERPL-MCNC: 7 G/DL — SIGNIFICANT CHANGE UP (ref 6.6–8.7)
PROT UR-MCNC: NEGATIVE MG/DL — SIGNIFICANT CHANGE UP
PROTHROM AB SERPL-ACNC: 12.6 SEC — SIGNIFICANT CHANGE UP (ref 10–12.9)
SODIUM SERPL-SCNC: 140 MMOL/L — SIGNIFICANT CHANGE UP (ref 135–145)
SP GR SPEC: 1.01 — SIGNIFICANT CHANGE UP (ref 1.01–1.02)
UROBILINOGEN FLD QL: NEGATIVE MG/DL — SIGNIFICANT CHANGE UP

## 2019-04-30 PROCEDURE — 43259 EGD US EXAM DUODENUM/JEJUNUM: CPT

## 2019-04-30 PROCEDURE — 43262 ENDO CHOLANGIOPANCREATOGRAPH: CPT | Mod: 59

## 2019-04-30 PROCEDURE — 43264 ERCP REMOVE DUCT CALCULI: CPT | Mod: 59

## 2019-04-30 PROCEDURE — 88342 IMHCHEM/IMCYTCHM 1ST ANTB: CPT | Mod: 26

## 2019-04-30 PROCEDURE — 88305 TISSUE EXAM BY PATHOLOGIST: CPT | Mod: 26

## 2019-04-30 RX ORDER — PANTOPRAZOLE SODIUM 20 MG/1
40 TABLET, DELAYED RELEASE ORAL
Qty: 0 | Refills: 0 | Status: DISCONTINUED | OUTPATIENT
Start: 2019-04-30 | End: 2019-05-01

## 2019-04-30 RX ORDER — INDOMETHACIN 50 MG
100 CAPSULE ORAL ONCE
Qty: 0 | Refills: 0 | Status: DISCONTINUED | OUTPATIENT
Start: 2019-04-30 | End: 2019-05-01

## 2019-04-30 RX ORDER — ENOXAPARIN SODIUM 100 MG/ML
40 INJECTION SUBCUTANEOUS DAILY
Qty: 0 | Refills: 0 | Status: DISCONTINUED | OUTPATIENT
Start: 2019-04-30 | End: 2019-05-01

## 2019-04-30 RX ADMIN — LOSARTAN POTASSIUM 100 MILLIGRAM(S): 100 TABLET, FILM COATED ORAL at 06:32

## 2019-04-30 RX ADMIN — Medication 25 MILLIGRAM(S): at 06:32

## 2019-04-30 RX ADMIN — Medication 100 GRAM(S): at 21:19

## 2019-04-30 RX ADMIN — ONDANSETRON 4 MILLIGRAM(S): 8 TABLET, FILM COATED ORAL at 12:21

## 2019-04-30 RX ADMIN — HYDROMORPHONE HYDROCHLORIDE 0.5 MILLIGRAM(S): 2 INJECTION INTRAMUSCULAR; INTRAVENOUS; SUBCUTANEOUS at 22:01

## 2019-04-30 RX ADMIN — HYDROMORPHONE HYDROCHLORIDE 0.5 MILLIGRAM(S): 2 INJECTION INTRAMUSCULAR; INTRAVENOUS; SUBCUTANEOUS at 12:21

## 2019-04-30 RX ADMIN — Medication 25 MILLIGRAM(S): at 17:54

## 2019-04-30 RX ADMIN — HYDROMORPHONE HYDROCHLORIDE 0.5 MILLIGRAM(S): 2 INJECTION INTRAMUSCULAR; INTRAVENOUS; SUBCUTANEOUS at 06:34

## 2019-04-30 RX ADMIN — HYDROMORPHONE HYDROCHLORIDE 0.5 MILLIGRAM(S): 2 INJECTION INTRAMUSCULAR; INTRAVENOUS; SUBCUTANEOUS at 06:49

## 2019-04-30 RX ADMIN — HYDROMORPHONE HYDROCHLORIDE 0.5 MILLIGRAM(S): 2 INJECTION INTRAMUSCULAR; INTRAVENOUS; SUBCUTANEOUS at 12:57

## 2019-04-30 RX ADMIN — Medication 100 GRAM(S): at 07:55

## 2019-04-30 RX ADMIN — ONDANSETRON 4 MILLIGRAM(S): 8 TABLET, FILM COATED ORAL at 06:34

## 2019-04-30 RX ADMIN — ENOXAPARIN SODIUM 40 MILLIGRAM(S): 100 INJECTION SUBCUTANEOUS at 17:54

## 2019-04-30 NOTE — PROGRESS NOTE ADULT - SUBJECTIVE AND OBJECTIVE BOX
Post-op Check    Subjective:  Pt offers no acute complaints at this time. Pain well controlled on current regiment. Denies chest pain, SOB, palpitations. Patient on clear liquid diet tolerating well, NPO aftermidnight for OR for lap aditi. Dr. Myrick (Cards), Dr. oMrgan aware. [employer] rep Newman is also aware and will reprogram acid/pacemaker prior to or as patient is pacer dependent. RN called earlier for some bradycardia patient was experiencing post procedure in the 40's asymptomatic, spoke with Dr. Myrick who was okay with the current hr, it has since resolved    STATUS POST:  ERCP, 6 small stones and sludged cleared from CBD    POST OPERATIVE DAY #: 0    MEDICATIONS  (STANDING):  cefoTEtan  IVPB      cefoTEtan  IVPB 2 Gram(s) IV Intermittent every 12 hours  enoxaparin Injectable 40 milliGRAM(s) SubCutaneous daily  hydrochlorothiazide 25 milliGRAM(s) Oral daily  indomethacin Suppository 100 milliGRAM(s) Rectal once  lactated ringers. 1000 milliLiter(s) (75 mL/Hr) IV Continuous <Continuous>  losartan 100 milliGRAM(s) Oral daily  metoprolol tartrate 25 milliGRAM(s) Oral two times a day  pantoprazole    Tablet 40 milliGRAM(s) Oral before breakfast    MEDICATIONS  (PRN):  HYDROmorphone  Injectable 0.5 milliGRAM(s) IV Push every 4 hours PRN Moderate Pain (4 - 6)  HYDROmorphone  Injectable 1 milliGRAM(s) IV Push every 4 hours PRN Severe Pain (7 - 10)  ondansetron Injectable 4 milliGRAM(s) IV Push every 4 hours PRN Nausea and/or Vomiting      Vital Signs Last 24 Hrs  T(C): 36.7 (30 Apr 2019 15:14), Max: 36.7 (29 Apr 2019 20:23)  T(F): 98.1 (30 Apr 2019 15:14), Max: 98.1 (29 Apr 2019 20:23)  HR: 48 (30 Apr 2019 15:14) (48 - 60)  BP: 113/66 (30 Apr 2019 15:14) (95/63 - 127/72)  BP(mean): --  RR: 18 (30 Apr 2019 15:14) (18 - 18)  SpO2: 97% (30 Apr 2019 04:50) (96% - 97%)    Physical Exam:    Constitutional: NAD  HEENT: PERRL, EOMI  Neck: No JVD, FROM without pain  Respiratory: no accessory muscle use, respirations non-labored  GI: abdomen soft, non-tender, tolerating clears   Neurological: A&O x 3; without gross deficit    A:     P:  Continue current care  Pain control  OOB as tolerated  Encourage IS  DVT ppx

## 2019-04-30 NOTE — PROGRESS NOTE ADULT - SUBJECTIVE AND OBJECTIVE BOX
INTERVAL HPI/OVERNIGHT EVENTS:    No acute overnight events reported. Patient seen this AM with no major complaints nor issues, pending EUS with GI, [employer] rep to reprogram pacemaker prior to procedure as previously established. Patient denies SOB, chest pain, nausea, vomiting, fevers nor chills      MEDICATIONS  (STANDING):  cefoTEtan  IVPB      cefoTEtan  IVPB 2 Gram(s) IV Intermittent every 12 hours  hydrochlorothiazide 25 milliGRAM(s) Oral daily  indomethacin Suppository 100 milliGRAM(s) Rectal once  lactated ringers. 1000 milliLiter(s) (75 mL/Hr) IV Continuous <Continuous>  losartan 100 milliGRAM(s) Oral daily  metoprolol tartrate 25 milliGRAM(s) Oral two times a day    MEDICATIONS  (PRN):  HYDROmorphone  Injectable 0.5 milliGRAM(s) IV Push every 4 hours PRN Moderate Pain (4 - 6)  HYDROmorphone  Injectable 1 milliGRAM(s) IV Push every 4 hours PRN Severe Pain (7 - 10)  ondansetron Injectable 4 milliGRAM(s) IV Push every 4 hours PRN Nausea and/or Vomiting      Vital Signs Last 24 Hrs  T(C): 36.7 (30 Apr 2019 04:50), Max: 36.7 (29 Apr 2019 20:23)  T(F): 98.1 (30 Apr 2019 04:50), Max: 98.1 (29 Apr 2019 20:23)  HR: 60 (30 Apr 2019 04:50) (59 - 60)  BP: 127/72 (30 Apr 2019 04:50) (95/63 - 127/72)  BP(mean): --  RR: 18 (30 Apr 2019 04:50) (18 - 18)  SpO2: 97% (30 Apr 2019 04:50) (96% - 98%)    PE  Gen: Not in acute distress  Pulm: nonlabored breathing  CV: RRR  Abd: soft, mildly tender upper quadrants  Ext: no pitting edema b/l  Vasc: 2+ radial and PT pulses B/L  Neuro: AAOX3      I&O's Detail    29 Apr 2019 07:01  -  30 Apr 2019 07:00  --------------------------------------------------------  IN:  Total IN: 0 mL    OUT:    Voided: 1250 mL  Total OUT: 1250 mL    Total NET: -1250 mL          LABS:                        14.3   5.6   )-----------( 177      ( 29 Apr 2019 04:27 )             42.8     04-30    140  |  98  |  13.0  ----------------------------<  67<L>  4.1   |  26.0  |  0.92    Ca    8.8      30 Apr 2019 06:05  Phos  3.2     04-29  Mg     2.1     04-30    TPro  7.0  /  Alb  3.9  /  TBili  1.7  /  DBili  0.4<H>  /  AST  50<H>  /  ALT  132<H>  /  AlkPhos  70  04-30          RADIOLOGY & ADDITIONAL STUDIES:

## 2019-04-30 NOTE — CHART NOTE - NSCHARTNOTEFT_GEN_A_CORE
LESLYE preston called regarding patient being bradycardiac to the 40's. I spoke with Dr. Myrick ( cardiology) who is okay with that as long he isn't symptomatic. Patient has a pacemaker/ AICD  and is pacer dependent.

## 2019-04-30 NOTE — BRIEF OPERATIVE NOTE - OPERATION/FINDINGS
EGD: Normal esophagus. Stomach: Antral gastritis. Gastric polyps. Bulbar duodenitis  EUS: Pancreas normal. Bile duct sludge and stones. Gall bladder sludge  ERCP: Biliary cannulation achieved with ease. Cholangiogram revealed some stones. Biliary sphincterotomy performed. Large amount of sludge and about five 3-4 mm dark stones removed with balloon sweeps. Bile duct irrigation performed with sterile water. Occluding cholangiogram unremarkable

## 2019-04-30 NOTE — PROGRESS NOTE ADULT - SUBJECTIVE AND OBJECTIVE BOX
SUBJECTIVE    pt just returned to room from EUS procedure when seen  awake and oriented; states he's groggy from anesthesia;  wife at bedside assisting pt    REVIEW OF SYSTEMS    General: Not in any pain	    Skin/Breast: No rash  	  ENMT: No visual problems, no sore throat	    Respiratory and Thorax: No cough, No CP, No SOB  	  Cardiovascular: No CP, No palpitations    Gastrointestinal: No Abd pain, No N/V/D    Musculoskeletal: No Joint pain, No back pain	    Neurological: No headache    Psychiatric: No anxiety      OBJECTIVE    Vital Signs Last 24 Hrs  T(C): 36.8 (04-30-19 @ 20:28), Max: 36.8 (04-30-19 @ 20:28)  T(F): 98.2 (04-30-19 @ 20:28), Max: 98.2 (04-30-19 @ 20:28)  HR: 60 (04-30-19 @ 20:28) (48 - 60)  BP: 126/71 (04-30-19 @ 20:28) (104/68 - 127/72)  BP(mean): --  RR: 18 (04-30-19 @ 20:28) (18 - 18)  SpO2: 97% (04-30-19 @ 04:50) (97% - 97%)    PHYSICAL EXAM:    Constitutional: Not in any distress    Eyes: No conjunctival injection    ENMT: No oral lesions    Neck: No nodes, no adenopathy    Back: Straight, no defects    Respiratory: clear b/l    Cardiovascular: RRR, no murmur    Gastrointestinal: soft, NT, ND    Extremities: No edema, no erythema    Neurological: no focal deficit    Skin: No rash      MEDICATIONS  (STANDING):  cefoTEtan  IVPB      cefoTEtan  IVPB 2 Gram(s) IV Intermittent every 12 hours  enoxaparin Injectable 40 milliGRAM(s) SubCutaneous daily  hydrochlorothiazide 25 milliGRAM(s) Oral daily  indomethacin Suppository 100 milliGRAM(s) Rectal once  lactated ringers. 1000 milliLiter(s) (75 mL/Hr) IV Continuous <Continuous>  losartan 100 milliGRAM(s) Oral daily  metoprolol tartrate 25 milliGRAM(s) Oral two times a day  pantoprazole    Tablet 40 milliGRAM(s) Oral before breakfast    MEDICATIONS  (PRN):  HYDROmorphone  Injectable 0.5 milliGRAM(s) IV Push every 4 hours PRN Moderate Pain (4 - 6)  HYDROmorphone  Injectable 1 milliGRAM(s) IV Push every 4 hours PRN Severe Pain (7 - 10)  ondansetron Injectable 4 milliGRAM(s) IV Push every 4 hours PRN Nausea and/or Vomiting                              14.3   5.6   )-----------( 177      ( 29 Apr 2019 04:27 )             42.8     30 Apr 2019 06:05    140    |  98     |  13.0   ----------------------------<  67     4.1     |  26.0   |  0.92     Ca    8.8        30 Apr 2019 06:05  Phos  3.2       29 Apr 2019 04:27  Mg     2.1       30 Apr 2019 06:05    TPro  7.0    /  Alb  3.9    /  TBili  1.7    /  DBili  0.4    /  AST  50     /  ALT  132    /  AlkPhos  70     30 Apr 2019 06:05    Allergies    Itchy throat, Cats (Other)  No Known Drug Allergies    Intolerances

## 2019-05-01 ENCOUNTER — RESULT REVIEW (OUTPATIENT)
Age: 49
End: 2019-05-01

## 2019-05-01 LAB
ALBUMIN SERPL ELPH-MCNC: 3.6 G/DL — SIGNIFICANT CHANGE UP (ref 3.3–5.2)
ALP SERPL-CCNC: 59 U/L — SIGNIFICANT CHANGE UP (ref 40–120)
ALT FLD-CCNC: 98 U/L — HIGH
ANION GAP SERPL CALC-SCNC: 16 MMOL/L — SIGNIFICANT CHANGE UP (ref 5–17)
AST SERPL-CCNC: 35 U/L — SIGNIFICANT CHANGE UP
BILIRUB SERPL-MCNC: 1.1 MG/DL — SIGNIFICANT CHANGE UP (ref 0.4–2)
BLD GP AB SCN SERPL QL: SIGNIFICANT CHANGE UP
BUN SERPL-MCNC: 17 MG/DL — SIGNIFICANT CHANGE UP (ref 8–20)
CALCIUM SERPL-MCNC: 8.8 MG/DL — SIGNIFICANT CHANGE UP (ref 8.6–10.2)
CHLORIDE SERPL-SCNC: 97 MMOL/L — LOW (ref 98–107)
CO2 SERPL-SCNC: 24 MMOL/L — SIGNIFICANT CHANGE UP (ref 22–29)
CREAT SERPL-MCNC: 0.9 MG/DL — SIGNIFICANT CHANGE UP (ref 0.5–1.3)
EOSINOPHIL # BLD AUTO: 0 K/UL — SIGNIFICANT CHANGE UP (ref 0–0.5)
EOSINOPHIL NFR BLD AUTO: 0 % — SIGNIFICANT CHANGE UP (ref 0–5)
GLUCOSE SERPL-MCNC: 117 MG/DL — HIGH (ref 70–115)
HCT VFR BLD CALC: 40.8 % — LOW (ref 42–52)
HGB BLD-MCNC: 14.1 G/DL — SIGNIFICANT CHANGE UP (ref 14–18)
LYMPHOCYTES # BLD AUTO: 0.6 K/UL — LOW (ref 1–4.8)
LYMPHOCYTES # BLD AUTO: 10 % — LOW (ref 20–55)
MAGNESIUM SERPL-MCNC: 2.1 MG/DL — SIGNIFICANT CHANGE UP (ref 1.6–2.6)
MCHC RBC-ENTMCNC: 29.4 PG — SIGNIFICANT CHANGE UP (ref 27–31)
MCHC RBC-ENTMCNC: 34.6 G/DL — SIGNIFICANT CHANGE UP (ref 32–36)
MCV RBC AUTO: 85 FL — SIGNIFICANT CHANGE UP (ref 80–94)
MONOCYTES # BLD AUTO: 0.3 K/UL — SIGNIFICANT CHANGE UP (ref 0–0.8)
MONOCYTES NFR BLD AUTO: 5.9 % — SIGNIFICANT CHANGE UP (ref 3–10)
NEUTROPHILS # BLD AUTO: 4.8 K/UL — SIGNIFICANT CHANGE UP (ref 1.8–8)
NEUTROPHILS NFR BLD AUTO: 83.9 % — HIGH (ref 37–73)
PHOSPHATE SERPL-MCNC: 3.7 MG/DL — SIGNIFICANT CHANGE UP (ref 2.4–4.7)
PLATELET # BLD AUTO: 180 K/UL — SIGNIFICANT CHANGE UP (ref 150–400)
POTASSIUM SERPL-MCNC: 3.8 MMOL/L — SIGNIFICANT CHANGE UP (ref 3.5–5.3)
POTASSIUM SERPL-SCNC: 3.8 MMOL/L — SIGNIFICANT CHANGE UP (ref 3.5–5.3)
PROT SERPL-MCNC: 6.6 G/DL — SIGNIFICANT CHANGE UP (ref 6.6–8.7)
RBC # BLD: 4.8 M/UL — SIGNIFICANT CHANGE UP (ref 4.6–6.2)
RBC # FLD: 13.5 % — SIGNIFICANT CHANGE UP (ref 11–15.6)
SODIUM SERPL-SCNC: 137 MMOL/L — SIGNIFICANT CHANGE UP (ref 135–145)
TYPE + AB SCN PNL BLD: SIGNIFICANT CHANGE UP
WBC # BLD: 5.7 K/UL — SIGNIFICANT CHANGE UP (ref 4.8–10.8)
WBC # FLD AUTO: 5.7 K/UL — SIGNIFICANT CHANGE UP (ref 4.8–10.8)

## 2019-05-01 PROCEDURE — 88304 TISSUE EXAM BY PATHOLOGIST: CPT | Mod: 26

## 2019-05-01 PROCEDURE — 47562 LAPAROSCOPIC CHOLECYSTECTOMY: CPT

## 2019-05-01 PROCEDURE — 47562 LAPAROSCOPIC CHOLECYSTECTOMY: CPT | Mod: AS

## 2019-05-01 PROCEDURE — 99232 SBSQ HOSP IP/OBS MODERATE 35: CPT

## 2019-05-01 RX ORDER — HYDROCHLOROTHIAZIDE 25 MG
25 TABLET ORAL DAILY
Qty: 0 | Refills: 0 | Status: DISCONTINUED | OUTPATIENT
Start: 2019-05-01 | End: 2019-05-02

## 2019-05-01 RX ORDER — PANTOPRAZOLE SODIUM 20 MG/1
40 TABLET, DELAYED RELEASE ORAL
Qty: 0 | Refills: 0 | Status: DISCONTINUED | OUTPATIENT
Start: 2019-05-01 | End: 2019-05-02

## 2019-05-01 RX ORDER — ONDANSETRON 8 MG/1
4 TABLET, FILM COATED ORAL EVERY 4 HOURS
Qty: 0 | Refills: 0 | Status: DISCONTINUED | OUTPATIENT
Start: 2019-05-01 | End: 2019-05-02

## 2019-05-01 RX ORDER — OXYCODONE HYDROCHLORIDE 5 MG/1
10 TABLET ORAL EVERY 4 HOURS
Qty: 0 | Refills: 0 | Status: DISCONTINUED | OUTPATIENT
Start: 2019-05-01 | End: 2019-05-02

## 2019-05-01 RX ORDER — SENNA PLUS 8.6 MG/1
2 TABLET ORAL AT BEDTIME
Qty: 0 | Refills: 0 | Status: DISCONTINUED | OUTPATIENT
Start: 2019-05-01 | End: 2019-05-02

## 2019-05-01 RX ORDER — HYDROMORPHONE HYDROCHLORIDE 2 MG/ML
0.5 INJECTION INTRAMUSCULAR; INTRAVENOUS; SUBCUTANEOUS
Qty: 0 | Refills: 0 | Status: DISCONTINUED | OUTPATIENT
Start: 2019-05-01 | End: 2019-05-01

## 2019-05-01 RX ORDER — LOSARTAN POTASSIUM 100 MG/1
100 TABLET, FILM COATED ORAL DAILY
Qty: 0 | Refills: 0 | Status: DISCONTINUED | OUTPATIENT
Start: 2019-05-01 | End: 2019-05-02

## 2019-05-01 RX ORDER — FENTANYL CITRATE 50 UG/ML
50 INJECTION INTRAVENOUS
Qty: 0 | Refills: 0 | Status: DISCONTINUED | OUTPATIENT
Start: 2019-05-01 | End: 2019-05-01

## 2019-05-01 RX ORDER — METOPROLOL TARTRATE 50 MG
25 TABLET ORAL
Qty: 0 | Refills: 0 | Status: DISCONTINUED | OUTPATIENT
Start: 2019-05-01 | End: 2019-05-02

## 2019-05-01 RX ORDER — OXYCODONE HYDROCHLORIDE 5 MG/1
5 TABLET ORAL EVERY 4 HOURS
Qty: 0 | Refills: 0 | Status: DISCONTINUED | OUTPATIENT
Start: 2019-05-01 | End: 2019-05-02

## 2019-05-01 RX ORDER — ENOXAPARIN SODIUM 100 MG/ML
40 INJECTION SUBCUTANEOUS DAILY
Qty: 0 | Refills: 0 | Status: DISCONTINUED | OUTPATIENT
Start: 2019-05-01 | End: 2019-05-02

## 2019-05-01 RX ORDER — DOCUSATE SODIUM 100 MG
100 CAPSULE ORAL THREE TIMES A DAY
Qty: 0 | Refills: 0 | Status: DISCONTINUED | OUTPATIENT
Start: 2019-05-01 | End: 2019-05-02

## 2019-05-01 RX ADMIN — FENTANYL CITRATE 50 MICROGRAM(S): 50 INJECTION INTRAVENOUS at 17:03

## 2019-05-01 RX ADMIN — OXYCODONE HYDROCHLORIDE 5 MILLIGRAM(S): 5 TABLET ORAL at 22:35

## 2019-05-01 RX ADMIN — Medication 25 MILLIGRAM(S): at 06:15

## 2019-05-01 RX ADMIN — SODIUM CHLORIDE 75 MILLILITER(S): 9 INJECTION, SOLUTION INTRAVENOUS at 10:37

## 2019-05-01 RX ADMIN — Medication 100 GRAM(S): at 10:33

## 2019-05-01 RX ADMIN — FENTANYL CITRATE 50 MICROGRAM(S): 50 INJECTION INTRAVENOUS at 17:18

## 2019-05-01 RX ADMIN — OXYCODONE HYDROCHLORIDE 5 MILLIGRAM(S): 5 TABLET ORAL at 23:30

## 2019-05-01 RX ADMIN — Medication 100 MILLIGRAM(S): at 22:33

## 2019-05-01 RX ADMIN — FENTANYL CITRATE 50 MICROGRAM(S): 50 INJECTION INTRAVENOUS at 17:28

## 2019-05-01 RX ADMIN — SENNA PLUS 2 TABLET(S): 8.6 TABLET ORAL at 22:33

## 2019-05-01 RX ADMIN — ENOXAPARIN SODIUM 40 MILLIGRAM(S): 100 INJECTION SUBCUTANEOUS at 22:37

## 2019-05-01 RX ADMIN — HYDROMORPHONE HYDROCHLORIDE 0.5 MILLIGRAM(S): 2 INJECTION INTRAMUSCULAR; INTRAVENOUS; SUBCUTANEOUS at 17:41

## 2019-05-01 RX ADMIN — LOSARTAN POTASSIUM 100 MILLIGRAM(S): 100 TABLET, FILM COATED ORAL at 06:15

## 2019-05-01 RX ADMIN — FENTANYL CITRATE 50 MICROGRAM(S): 50 INJECTION INTRAVENOUS at 16:53

## 2019-05-01 RX ADMIN — PANTOPRAZOLE SODIUM 40 MILLIGRAM(S): 20 TABLET, DELAYED RELEASE ORAL at 06:15

## 2019-05-01 NOTE — BRIEF OPERATIVE NOTE - OPERATION/FINDINGS
mildly inflamed gallbladder with clear visualization of cystic artery and cystic duct prior to clipping. mild bile spillage with copious irrigation. adequate hemostasis in liver bed. Mildly inflamed gallbladder with clear visualization of cystic artery and cystic duct prior to clipping.  Critical view and cystic plate identified.  Mild bile spillage with copious irrigation. Adequate hemostasis in liver bed.

## 2019-05-01 NOTE — PROGRESS NOTE ADULT - SUBJECTIVE AND OBJECTIVE BOX
INTERVAL HPI/OVERNIGHT EVENTS:s/p EUS and ERCP. Noted to have gastritis, gastric polyps, duodenitis, CBD sludge and stone.Minimal abdominal pain. LFts better. Scheduled for surgery    MEDICATIONS  (STANDING):  cefoTEtan  IVPB      cefoTEtan  IVPB 2 Gram(s) IV Intermittent every 12 hours  enoxaparin Injectable 40 milliGRAM(s) SubCutaneous daily  hydrochlorothiazide 25 milliGRAM(s) Oral daily  indomethacin Suppository 100 milliGRAM(s) Rectal once  lactated ringers. 1000 milliLiter(s) (75 mL/Hr) IV Continuous <Continuous>  losartan 100 milliGRAM(s) Oral daily  metoprolol tartrate 25 milliGRAM(s) Oral two times a day  pantoprazole    Tablet 40 milliGRAM(s) Oral before breakfast    MEDICATIONS  (PRN):  HYDROmorphone  Injectable 0.5 milliGRAM(s) IV Push every 4 hours PRN Moderate Pain (4 - 6)  HYDROmorphone  Injectable 1 milliGRAM(s) IV Push every 4 hours PRN Severe Pain (7 - 10)  ondansetron Injectable 4 milliGRAM(s) IV Push every 4 hours PRN Nausea and/or Vomiting      Allergies    Itchy throat, Cats (Other)  No Known Drug Allergies    Intolerances        Vital Signs Last 24 Hrs  T(C): 36.9 (01 May 2019 04:36), Max: 36.9 (2019 23:51)  T(F): 98.4 (01 May 2019 04:36), Max: 98.5 (2019 23:51)  HR: 60 (01 May 2019 04:51) (48 - 67)  BP: 130/60 (01 May 2019 04:51) (113/66 - 130/60)  BP(mean): --  RR: 18 (2019 23:51) (18 - 18)  SpO2: 95% (2019 23:51) (94% - 95%)    LABS:                        14.1   5.7   )-----------( 180      ( 01 May 2019 05:11 )             40.8     05    137  |  97<L>  |  17.0  ----------------------------<  117<H>  3.8   |  24.0  |  0.90    Ca    8.8      01 May 2019 05:11  Phos  3.7     05-  Mg     2.1     -    TPro  6.6  /  Alb  3.6  /  TBili  1.1  /  DBili  x   /  AST  35  /  ALT  98<H>  /  AlkPhos  59  -    PT/INR - ( 2019 11:28 )   PT: 12.6 sec;   INR: 1.09 ratio           Urinalysis Basic - ( 2019 13:51 )    Color: Yellow / Appearance: Clear / S.010 / pH: x  Gluc: x / Ketone: Moderate  / Bili: Negative / Urobili: Negative mg/dL   Blood: x / Protein: Negative mg/dL / Nitrite: Negative   Leuk Esterase: Negative / RBC: x / WBC x   Sq Epi: x / Non Sq Epi: x / Bacteria: x        RADIOLOGY & ADDITIONAL TESTS:

## 2019-05-01 NOTE — PROGRESS NOTE ADULT - SUBJECTIVE AND OBJECTIVE BOX
SUBJECTIVE    REVIEW OF SYSTEMS    General: Not in any pain	    Skin/Breast: No rash  	  ENMT: No visual problems, no sore throat	    Respiratory and Thorax: No cough, No CP, No SOB  	  Cardiovascular: No CP, No palpitations    Gastrointestinal: No Abd pain, No N/V/D    Musculoskeletal: No Joint pain, No back pain	    Neurological: No headache    Psychiatric: No anxiety      OBJECTIVE    Vital Signs Last 24 Hrs  T(C): 37.3 (05-01-19 @ 15:40), Max: 37.3 (05-01-19 @ 15:40)  T(F): 99.1 (05-01-19 @ 15:40), Max: 99.1 (05-01-19 @ 15:40)  HR: 69 (05-01-19 @ 18:15) (60 - 69)  BP: 121/70 (05-01-19 @ 18:15) (93/69 - 130/60)  BP(mean): --  RR: 17 (05-01-19 @ 18:15) (11 - 24)  SpO2: 96% (05-01-19 @ 18:15) (93% - 100%)    PHYSICAL EXAM:    Constitutional: Not in any distress    Eyes: No conjunctival injection    ENMT: No oral lesions    Neck: No nodes, no adenopathy    Back: Straight, no defects    Respiratory: clear b/l    Cardiovascular: RRR, no murmur    Gastrointestinal: soft, NT, ND    Extremities: No edema, no erythema    Neurological: no focal deficit    Skin: No rash      MEDICATIONS  (STANDING):  docusate sodium 100 milliGRAM(s) Oral three times a day  enoxaparin Injectable 40 milliGRAM(s) SubCutaneous daily  hydrochlorothiazide 25 milliGRAM(s) Oral daily  losartan 100 milliGRAM(s) Oral daily  metoprolol tartrate 25 milliGRAM(s) Oral two times a day  pantoprazole    Tablet 40 milliGRAM(s) Oral before breakfast  senna 2 Tablet(s) Oral at bedtime    MEDICATIONS  (PRN):  fentaNYL    Injectable 50 MICROGram(s) IV Push every 5 minutes PRN Severe Pain (7 - 10)  HYDROmorphone  Injectable 0.5 milliGRAM(s) IV Push every 10 minutes PRN Moderate Pain (4 - 6)  ondansetron Injectable 4 milliGRAM(s) IV Push every 4 hours PRN Nausea and/or Vomiting  oxyCODONE    IR 5 milliGRAM(s) Oral every 4 hours PRN Moderate Pain (4 - 6)  oxyCODONE    IR 10 milliGRAM(s) Oral every 4 hours PRN Severe Pain (7 - 10)                              14.1   5.7   )-----------( 180      ( 01 May 2019 05:11 )             40.8     01 May 2019 05:11    137    |  97     |  17.0   ----------------------------<  117    3.8     |  24.0   |  0.90     Ca    8.8        01 May 2019 05:11  Phos  3.7       01 May 2019 05:11  Mg     2.1       01 May 2019 05:11    TPro  6.6    /  Alb  3.6    /  TBili  1.1    /  DBili  x      /  AST  35     /  ALT  98     /  AlkPhos  59     01 May 2019 05:11    Allergies    Itchy throat, Cats (Other)  No Known Drug Allergies    Intolerances

## 2019-05-01 NOTE — PROGRESS NOTE ADULT - ASSESSMENT
47 y/o male with hx of Tetralogy of Fallot s/p repair, PVR, postoperative atrial flutter.  With symptomatic cholelithiasis.     Pediatric Cardiologist - Akil Navarrete at [facility]     Echocardiogram (images reviewed by me): reviewed while it was being performed.  Tetralogy of Fallot s/p repair - no residual VSD, bioprosthetic PV, with elevated gradients, mild PI, RV mildly enlarged with low normal systolic function.  LV systolic function normal.  Pending formal report.     # Tetralogy of Fallot  # symptomatic cholelithiasis  # hx atrial flutter - perioperative, no recurrence, off AC    IVF - avoid volume overload  Resume metoprolol (hold for HR < 60, SBP < 90) - while NPO can place of lopressor 5 mg IV q6 hr with the same holding parameters  losartan with holding parameters (Hold for SBP < 100)  Optimized for surgery  - ERCP/lap cholecystectomy  If cautery is used for either procedure, will need ICD reprogrammed, patient is pacer dependent so would place on VOO setting during the procedure.  [employer] rep aware.  Surgical team to contact [employer] rep prior to the procedures.       Will follow.  Thank you for allowing me to participate in care of your patient.   D/W surgical team, Dr. Morgan, Dr. Navarrete  Please don't hesitate to call with questions
48y M w/ tetralogy of falot presenting w/ ruq pain ruling outcholedocholithiasis, s./p ERCP yesterday with stone retrieval and sphincterotomy;     -Patient went for ERCP yesterday, with sludge and 5 stones removed and sphincterotomy  - NPO  - IVF  - pain control  - monitor LFTs   -Planned for OR lap aditi today  -[employer] rep Newman has been called with planned OR time 11AM, to pre-op program his pacemaker, as what was done yesterday pre ERCP
48y M w/ tetralogy of falot presenting w/ ruq pain ruling outcholedocholithiasis, scheduled for EUS today with GI    - EUS with GI today    - NPO  - IVF  - pain control  - monitor LFTs
48y M w/ tetralogy of falot presenting w/ ruq pain ruling outcholedocholithiasis. Us abdomen with dilated CBD but LFts improving    - ECHO this am and FU Cards  - FU GI today for possible EUS ERCP   - NPO  - IVF  - pain control  - monitor LFTs
48yoM with symptomatic cholelithiasis. Tbili downtrended to 1.8 from 2.8. Pain moderately improving  - Continue NPO/IVF  - continue Abx  - f/u GI consult for ERCP  - cards: TTE, obtaining records from [facility]  - plan for future lap aditi  - dispo: continue inpt care at this time
49 y/o male with hx of Tetralogy of Fallot s/p repair, PVR, postoperative atrial flutter.  With symptomatic cholelithiasis.     Pediatric Cardiologist - Akil Navarrete at [facility]     Echocardiogram (images reviewed by me): reviewed while it was being performed.  Tetralogy of Fallot s/p repair - no residual VSD, bioprosthetic PV, with elevated gradients, mild PI, RV mildly enlarged with low normal systolic function.  LV systolic function normal.  Pending formal report.     # Tetralogy of Fallot  # symptomatic cholelithiasis s/p ERCP and lap cholecystectomy  # hx atrial flutter - perioperative, no recurrence, off AC  # bradycardia - reported; likely secondary to anesthesia from ERCP; device is functioning normally    ok to monitor on telemetry x 24 hrs  no changes in cardiac meds  post op care per surgery    Thank you for allowing me to participate in care of your patient.   Please don't hesitate to call with questions
pt seen and examined by me at 11 am today  outpt medical records, hosp chart, consult notes, labs and radiology reviewed  pt appears physically optimized for planned cholecystectomy tomorrow and agreeable to proceed
Patient with cholelithiasis, choledocholithasis s/p ERCP  PPI for gastritis  GI will follow up prn

## 2019-05-01 NOTE — CHART NOTE - NSCHARTNOTEFT_GEN_A_CORE
POST-OPERATIVE NOTE    PROCEDURE: Lap Cholecystectomy    INTERVAL HPI: Pain well controlled. Tolerating PO intake. Voiding. -Flatus, -BM. No ambulation since OR.  Denies N/V/CP/SOB.     Vital Signs Last 24 Hrs  T(C): 36.9 (01 May 2019 19:54), Max: 37.3 (01 May 2019 15:40)  T(F): 98.5 (01 May 2019 19:54), Max: 99.1 (01 May 2019 15:40)  HR: 61 (01 May 2019 19:54) (60 - 69)  BP: 90/63 (01 May 2019 19:54) (90/63 - 130/60)  BP(mean): --  RR: 18 (01 May 2019 19:54) (11 - 24)  SpO2: 94% (01 May 2019 19:54) (93% - 100%)    PE  Gen: AAO x3, NAD  Pulm: CTAB, Symmetrical chest rise  CV: RRR  Abd: Soft, mild tenderness, ND, -R/-G, incisions c/i/d  Ext: No C/C/E  Vasc: 2+ Radial, DP pulses  Neuro: No focal neurological deficits    48 year old male s/p Lap Analy seen to be recovering well with no major issues nor complaints.    -Regular diet  -pain management  -encourage ambulation  -encourage I/S  -possible dc 5/2

## 2019-05-01 NOTE — BRIEF OPERATIVE NOTE - NSICDXBRIEFPREOP_GEN_ALL_CORE_FT
PRE-OP DIAGNOSIS:  Cholelithiasis 30-Apr-2019 09:20:11  Babak Gonzales  Elevated LFTs 30-Apr-2019 09:19:58  Babak Gonzales  Abdominal pain 30-Apr-2019 09:19:51  Babak Gonzales
PRE-OP DIAGNOSIS:  Cholelithiasis 30-Apr-2019 09:20:11  Babak Gonzales

## 2019-05-01 NOTE — PROGRESS NOTE ADULT - SUBJECTIVE AND OBJECTIVE BOX
INTERVAL HPI/OVERNIGHT EVENTS:    SUBJECTIVE:  No acute events overnight, no complaints. Patient went for ERCP yesterday, with sludge and 5 stones removed and sphincterotomy  Planned for OR lap aditi today  [employer] rep Newman has been called with planned OR time 11AM, to pre-op program his pacemaker, as what was done yesterday pre ERCP    MEDICATIONS  (STANDING):  cefoTEtan  IVPB      cefoTEtan  IVPB 2 Gram(s) IV Intermittent every 12 hours  enoxaparin Injectable 40 milliGRAM(s) SubCutaneous daily  hydrochlorothiazide 25 milliGRAM(s) Oral daily  indomethacin Suppository 100 milliGRAM(s) Rectal once  lactated ringers. 1000 milliLiter(s) (75 mL/Hr) IV Continuous <Continuous>  losartan 100 milliGRAM(s) Oral daily  metoprolol tartrate 25 milliGRAM(s) Oral two times a day  pantoprazole    Tablet 40 milliGRAM(s) Oral before breakfast    MEDICATIONS  (PRN):  HYDROmorphone  Injectable 0.5 milliGRAM(s) IV Push every 4 hours PRN Moderate Pain (4 - 6)  HYDROmorphone  Injectable 1 milliGRAM(s) IV Push every 4 hours PRN Severe Pain (7 - 10)  ondansetron Injectable 4 milliGRAM(s) IV Push every 4 hours PRN Nausea and/or Vomiting      Vital Signs Last 24 Hrs  T(C): 36.9 (2019 23:51), Max: 36.9 (2019 23:51)  T(F): 98.5 (2019 23:51), Max: 98.5 (2019 23:51)  HR: 60 (2019 23:51) (48 - 60)  BP: 118/65 (2019 23:51) (113/66 - 127/72)  BP(mean): --  RR: 18 (2019 23:51) (18 - 18)  SpO2: 95% (2019 23:51) (94% - 97%)    PE  Gen: Not in acute distress  Pulm: nonlabored breathing  CV: RRR  Abd: soft, nontender upper quadrants  Ext: no pitting edema b/l  Vasc: 2+ radial and PT pulses B/L  Neuro: AAOX3      I&O's Detail    2019 07:01  -  2019 07:00  --------------------------------------------------------  IN:  Total IN: 0 mL    OUT:    Voided: 1250 mL  Total OUT: 1250 mL    Total NET: -1250 mL      2019 07:01  -  01 May 2019 02:47  --------------------------------------------------------  IN:  Total IN: 0 mL    OUT:    Voided: 1000 mL  Total OUT: 1000 mL    Total NET: -1000 mL          LABS:                        14.3   5.6   )-----------( 177      ( 2019 04:27 )             42.8     04-30    140  |  98  |  13.0  ----------------------------<  67<L>  4.1   |  26.0  |  0.92    Ca    8.8      2019 06:05  Phos  3.2     04-29  Mg     2.1     04-30    TPro  7.0  /  Alb  3.9  /  TBili  1.7  /  DBili  0.4<H>  /  AST  50<H>  /  ALT  132<H>  /  AlkPhos  70  04-30    PT/INR - ( 2019 11:28 )   PT: 12.6 sec;   INR: 1.09 ratio           Urinalysis Basic - ( 2019 13:51 )    Color: Yellow / Appearance: Clear / S.010 / pH: x  Gluc: x / Ketone: Moderate  / Bili: Negative / Urobili: Negative mg/dL   Blood: x / Protein: Negative mg/dL / Nitrite: Negative   Leuk Esterase: Negative / RBC: x / WBC x   Sq Epi: x / Non Sq Epi: x / Bacteria: x

## 2019-05-01 NOTE — PROGRESS NOTE ADULT - SUBJECTIVE AND OBJECTIVE BOX
[facility]                                                        Office: [address] [address]                                                       Telephone: [phone]. Fax:[phone]      CARDIOLOGY PROGRESS NOTE   ([facility])    Subjective: Patient seen and examined earlier this evening.  Just arrived from PACU.  Status post uncomplicated laparoscopic cholecystectomy. Reports HRs in the 40s yesterday post ERCP - asymptomatic.  No telemetry strips available in chart to document that.  All strips reviewed - most at AV paced at 60, one episode sinus rhythm with 1st degree AVB V paced at 52 bpm.     ROS: No headache, no chest pain, no SOB, no palpitations, no dizziness, no nausea, no bleeding    Chronic Conditions:     CURRENT MEDICATIONS  hydrochlorothiazide 25 milliGRAM(s) Oral daily  losartan 100 milliGRAM(s) Oral daily  metoprolol tartrate 25 milliGRAM(s) Oral two times a day        ondansetron Injectable 4 milliGRAM(s) IV Push every 4 hours PRN  oxyCODONE    IR 5 milliGRAM(s) Oral every 4 hours PRN  oxyCODONE    IR 10 milliGRAM(s) Oral every 4 hours PRN    docusate sodium 100 milliGRAM(s) Oral three times a day  pantoprazole    Tablet 40 milliGRAM(s) Oral before breakfast  senna 2 Tablet(s) Oral at bedtime      enoxaparin Injectable 40 milliGRAM(s) SubCutaneous daily    	  TELEMETRY: AV paced 60  Vitals:  T(C): 37.3 (05-01-19 @ 15:40), Max: 37.3 (05-01-19 @ 15:40)  HR: 69 (05-01-19 @ 18:15) (60 - 69)  BP: 121/70 (05-01-19 @ 18:15) (93/69 - 130/60)  RR: 17 (05-01-19 @ 18:15) (11 - 24)  SpO2: 96% (05-01-19 @ 18:15) (93% - 100%)  Wt(kg): --  I&O's Summary    30 Apr 2019 07:01  -  01 May 2019 07:00  --------------------------------------------------------  IN: 0 mL / OUT: 1000 mL / NET: -1000 mL    01 May 2019 07:01  -  01 May 2019 19:30  --------------------------------------------------------  IN: 225 mL / OUT: 900 mL / NET: -675 mL      Height (cm): 167.64 (05-01 @ 14:29)  Weight (kg): 43.2 (05-01 @ 14:29)  BMI (kg/m2): 15.4 (05-01 @ 14:29)  BSA (m2): 1.46 (05-01 @ 14:29)  Daily T(C): 37.3 (05-01-19 @ 15:40), Max: 37.3 (05-01-19 @ 15:40)  HR: 69 (05-01-19 @ 18:15) (60 - 69)  BP: 121/70 (05-01-19 @ 18:15) (93/69 - 130/60)  RR: 17 (05-01-19 @ 18:15) (11 - 24)  SpO2: 96% (05-01-19 @ 18:15) (93% - 100%)  Wt(kg): --    Daily Height (cm): 167.64 (05-01 @ 14:29)  Weight (kg): 43.2 (05-01 @ 14:29)  BMI (kg/m2): 15.4 (05-01 @ 14:29)  BSA (m2): 1.46 (05-01 @ 14:29)    PHYSICAL EXAM:  Appearance: Normal, NAD	  HEENT:   Normal oral mucosa, PERRL, EOMI	  Lymphatic: No lymphadenopathy  Cardiovascular: Normal S1 S2, No JVD, III/VI systolic murmur No edema  Respiratory: Lungs clear to auscultation	  Psychiatry: A & O x 3, Mood & affect appropriate  Gastrointestinal:  Soft  Skin: No rashes, No ecchymoses, No cyanosis  Neurologic: Non-focal  Extremities: Normal range of motion, No clubbing, cyanosis or edema  Vascular: Peripheral pulses palpable 2+ bilaterally, warm      ECG (tracing reviewed by me):  	    DIAGNOSTIC TESTING:  Echocardiogram (images reviewed by me):  Catheterization:  Stress Test:    CXR (image reviewed by me):  OTHER: 	    LABS:	 	  CARDIAC MARKERS:                                  14.1   5.7   )-----------( 180      ( 01 May 2019 05:11 )             40.8     05-01    137  |  97<L>  |  17.0  ----------------------------<  117<H>  3.8   |  24.0  |  0.90    Ca    8.8      01 May 2019 05:11  Phos  3.7     05-01  Mg     2.1     05-01    TPro  6.6  /  Alb  3.6  /  TBili  1.1  /  DBili  x   /  AST  35  /  ALT  98<H>  /  AlkPhos  59  05-01    BNP:   Lipid Profile:   HgA1c:   TSH:

## 2019-05-01 NOTE — BRIEF OPERATIVE NOTE - COMMENTS
Monitor LFTs  PPI therapy  Treat for H. pylori if positive  Cholecystectomy as per surgical team  Call for abnormal symptoms or complaints  Diet as per surgical team
wound class 2

## 2019-05-01 NOTE — BRIEF OPERATIVE NOTE - NSICDXBRIEFPOSTOP_GEN_ALL_CORE_FT
POST-OP DIAGNOSIS:  Gastritis and duodenitis 30-Apr-2019 09:20:45  Babak Gonzales  Choledocholithiasis with obstruction 30-Apr-2019 09:20:33  Babak Gonzales
POST-OP DIAGNOSIS:  Symptomatic cholelithiasis 01-May-2019 15:43:01  Sonya Lindo

## 2019-05-01 NOTE — BRIEF OPERATIVE NOTE - NSICDXBRIEFPROCEDURE_GEN_ALL_CORE_FT
PROCEDURES:  ERCP, with sphincterotomy and calculus removal 30-Apr-2019 09:19:38  Babak Gonzales  Endoscopic ultrasound of bile duct 30-Apr-2019 09:19:20  Babak Gonzales  EGD with biopsy 30-Apr-2019 09:19:08  Babak Gonzales
PROCEDURES:  Laparoscopic cholecystectomy 01-May-2019 15:43:23  Sonya Lindo

## 2019-05-02 ENCOUNTER — TRANSCRIPTION ENCOUNTER (OUTPATIENT)
Age: 49
End: 2019-05-02

## 2019-05-02 VITALS
HEART RATE: 80 BPM | SYSTOLIC BLOOD PRESSURE: 133 MMHG | TEMPERATURE: 98 F | OXYGEN SATURATION: 92 % | DIASTOLIC BLOOD PRESSURE: 77 MMHG | RESPIRATION RATE: 18 BRPM

## 2019-05-02 LAB
ALBUMIN SERPL ELPH-MCNC: 3.8 G/DL — SIGNIFICANT CHANGE UP (ref 3.3–5.2)
ALP SERPL-CCNC: 58 U/L — SIGNIFICANT CHANGE UP (ref 40–120)
ALT FLD-CCNC: 131 U/L — HIGH
ANION GAP SERPL CALC-SCNC: 13 MMOL/L — SIGNIFICANT CHANGE UP (ref 5–17)
AST SERPL-CCNC: 84 U/L — HIGH
BILIRUB SERPL-MCNC: 0.8 MG/DL — SIGNIFICANT CHANGE UP (ref 0.4–2)
BUN SERPL-MCNC: 16 MG/DL — SIGNIFICANT CHANGE UP (ref 8–20)
CALCIUM SERPL-MCNC: 8.9 MG/DL — SIGNIFICANT CHANGE UP (ref 8.6–10.2)
CHLORIDE SERPL-SCNC: 99 MMOL/L — SIGNIFICANT CHANGE UP (ref 98–107)
CO2 SERPL-SCNC: 29 MMOL/L — SIGNIFICANT CHANGE UP (ref 22–29)
CREAT SERPL-MCNC: 1.13 MG/DL — SIGNIFICANT CHANGE UP (ref 0.5–1.3)
GLUCOSE SERPL-MCNC: 129 MG/DL — HIGH (ref 70–115)
MAGNESIUM SERPL-MCNC: 2.2 MG/DL — SIGNIFICANT CHANGE UP (ref 1.6–2.6)
PHOSPHATE SERPL-MCNC: 3.9 MG/DL — SIGNIFICANT CHANGE UP (ref 2.4–4.7)
POTASSIUM SERPL-MCNC: 4 MMOL/L — SIGNIFICANT CHANGE UP (ref 3.5–5.3)
POTASSIUM SERPL-SCNC: 4 MMOL/L — SIGNIFICANT CHANGE UP (ref 3.5–5.3)
PROT SERPL-MCNC: 6.5 G/DL — LOW (ref 6.6–8.7)
SODIUM SERPL-SCNC: 141 MMOL/L — SIGNIFICANT CHANGE UP (ref 135–145)

## 2019-05-02 PROCEDURE — 86850 RBC ANTIBODY SCREEN: CPT

## 2019-05-02 PROCEDURE — 80074 ACUTE HEPATITIS PANEL: CPT

## 2019-05-02 PROCEDURE — 86901 BLOOD TYPING SEROLOGIC RH(D): CPT

## 2019-05-02 PROCEDURE — 99024 POSTOP FOLLOW-UP VISIT: CPT

## 2019-05-02 PROCEDURE — 88305 TISSUE EXAM BY PATHOLOGIST: CPT

## 2019-05-02 PROCEDURE — 93306 TTE W/DOPPLER COMPLETE: CPT

## 2019-05-02 PROCEDURE — 76705 ECHO EXAM OF ABDOMEN: CPT

## 2019-05-02 PROCEDURE — 81003 URINALYSIS AUTO W/O SCOPE: CPT

## 2019-05-02 PROCEDURE — 88304 TISSUE EXAM BY PATHOLOGIST: CPT

## 2019-05-02 PROCEDURE — 81001 URINALYSIS AUTO W/SCOPE: CPT

## 2019-05-02 PROCEDURE — 80076 HEPATIC FUNCTION PANEL: CPT

## 2019-05-02 PROCEDURE — 86900 BLOOD TYPING SEROLOGIC ABO: CPT

## 2019-05-02 PROCEDURE — 84100 ASSAY OF PHOSPHORUS: CPT

## 2019-05-02 PROCEDURE — 83735 ASSAY OF MAGNESIUM: CPT

## 2019-05-02 PROCEDURE — 80048 BASIC METABOLIC PNL TOTAL CA: CPT

## 2019-05-02 PROCEDURE — 74330 X-RAY BILE/PANC ENDOSCOPY: CPT

## 2019-05-02 PROCEDURE — 85610 PROTHROMBIN TIME: CPT

## 2019-05-02 PROCEDURE — 87086 URINE CULTURE/COLONY COUNT: CPT

## 2019-05-02 PROCEDURE — 85027 COMPLETE CBC AUTOMATED: CPT

## 2019-05-02 PROCEDURE — 82248 BILIRUBIN DIRECT: CPT

## 2019-05-02 PROCEDURE — 36415 COLL VENOUS BLD VENIPUNCTURE: CPT

## 2019-05-02 PROCEDURE — 93005 ELECTROCARDIOGRAM TRACING: CPT

## 2019-05-02 PROCEDURE — 99285 EMERGENCY DEPT VISIT HI MDM: CPT

## 2019-05-02 PROCEDURE — 83690 ASSAY OF LIPASE: CPT

## 2019-05-02 PROCEDURE — 80053 COMPREHEN METABOLIC PANEL: CPT

## 2019-05-02 PROCEDURE — 88342 IMHCHEM/IMCYTCHM 1ST ANTB: CPT

## 2019-05-02 RX ORDER — LOSARTAN POTASSIUM 100 MG/1
1 TABLET, FILM COATED ORAL
Qty: 0 | Refills: 0 | COMMUNITY
Start: 2019-05-02

## 2019-05-02 RX ORDER — METOPROLOL TARTRATE 50 MG
1 TABLET ORAL
Qty: 0 | Refills: 0 | COMMUNITY
Start: 2019-05-02

## 2019-05-02 RX ORDER — DOCUSATE SODIUM 100 MG
1 CAPSULE ORAL
Qty: 0 | Refills: 0 | COMMUNITY
Start: 2019-05-02

## 2019-05-02 RX ADMIN — Medication 25 MILLIGRAM(S): at 05:21

## 2019-05-02 RX ADMIN — OXYCODONE HYDROCHLORIDE 5 MILLIGRAM(S): 5 TABLET ORAL at 18:31

## 2019-05-02 RX ADMIN — ENOXAPARIN SODIUM 40 MILLIGRAM(S): 100 INJECTION SUBCUTANEOUS at 13:20

## 2019-05-02 RX ADMIN — OXYCODONE HYDROCHLORIDE 10 MILLIGRAM(S): 5 TABLET ORAL at 11:30

## 2019-05-02 RX ADMIN — OXYCODONE HYDROCHLORIDE 5 MILLIGRAM(S): 5 TABLET ORAL at 05:22

## 2019-05-02 RX ADMIN — OXYCODONE HYDROCHLORIDE 10 MILLIGRAM(S): 5 TABLET ORAL at 10:49

## 2019-05-02 RX ADMIN — OXYCODONE HYDROCHLORIDE 5 MILLIGRAM(S): 5 TABLET ORAL at 18:53

## 2019-05-02 RX ADMIN — Medication 100 MILLIGRAM(S): at 13:22

## 2019-05-02 RX ADMIN — Medication 100 MILLIGRAM(S): at 05:22

## 2019-05-02 RX ADMIN — LOSARTAN POTASSIUM 100 MILLIGRAM(S): 100 TABLET, FILM COATED ORAL at 05:21

## 2019-05-02 RX ADMIN — PANTOPRAZOLE SODIUM 40 MILLIGRAM(S): 20 TABLET, DELAYED RELEASE ORAL at 05:21

## 2019-05-02 RX ADMIN — Medication 25 MILLIGRAM(S): at 17:44

## 2019-05-02 NOTE — PROGRESS NOTE ADULT - PROBLEM SELECTOR PLAN 2
s/p dual chamber ICD 2013
With dual-chamber AICD.  An echocardiogram during this admission and EKG with no significant changes from previous echoes and EKGs.  No incidents during that this admission.  Follow up with cardiology as outpatient.
s/p dual chamber ICD in 2013
with dual chamber ICD

## 2019-05-02 NOTE — PROGRESS NOTE ADULT - SUBJECTIVE AND OBJECTIVE BOX
SUBJECTIVE    pt feeling ok, wife at bedside;  pt and family feel ready to go home    REVIEW OF SYSTEMS    General: Not in any pain	    Skin/Breast: No rash  	  ENMT: No visual problems, no sore throat	    Respiratory and Thorax: No cough, No CP, No SOB  	  Cardiovascular: No CP, No palpitations    Gastrointestinal: mild Abd pain, No N/V/D    Musculoskeletal: No Joint pain, No back pain	    Neurological: No headache    Psychiatric: No anxiety      OBJECTIVE    Vital Signs Last 24 Hrs  T(C): 36.5 (05-02-19 @ 19:20), Max: 37.3 (05-02-19 @ 00:30)  T(F): 97.7 (05-02-19 @ 19:20), Max: 99.1 (05-02-19 @ 00:30)  HR: 66 (05-02-19 @ 19:20) (60 - 100)  BP: 110/67 (05-02-19 @ 19:20) (97/56 - 124/80)  BP(mean): --  RR: 18 (05-02-19 @ 19:20) (18 - 18)  SpO2: 92% (05-02-19 @ 19:20) (92% - 97%)    PHYSICAL EXAM:    Constitutional: Not in any distress    Eyes: No conjunctival injection    ENMT: No oral lesions    Neck: No nodes, no adenopathy    Back: Straight, no defects    Respiratory: clear b/l    Cardiovascular: RRR, no murmur    Gastrointestinal: +bs    Extremities: No edema, no erythema    Neurological: no focal deficit    Skin: No rash      MEDICATIONS  (STANDING):  docusate sodium 100 milliGRAM(s) Oral three times a day  enoxaparin Injectable 40 milliGRAM(s) SubCutaneous daily  hydrochlorothiazide 25 milliGRAM(s) Oral daily  losartan 100 milliGRAM(s) Oral daily  metoprolol tartrate 25 milliGRAM(s) Oral two times a day  pantoprazole    Tablet 40 milliGRAM(s) Oral before breakfast  senna 2 Tablet(s) Oral at bedtime    MEDICATIONS  (PRN):  ondansetron Injectable 4 milliGRAM(s) IV Push every 4 hours PRN Nausea and/or Vomiting  oxyCODONE    IR 5 milliGRAM(s) Oral every 4 hours PRN Moderate Pain (4 - 6)  oxyCODONE    IR 10 milliGRAM(s) Oral every 4 hours PRN Severe Pain (7 - 10)                              14.1   5.7   )-----------( 180      ( 01 May 2019 05:11 )             40.8     02 May 2019 04:31    141    |  99     |  16.0   ----------------------------<  129    4.0     |  29.0   |  1.13     Ca    8.9        02 May 2019 04:31  Phos  3.9       02 May 2019 04:31  Mg     2.2       02 May 2019 04:31    TPro  6.5    /  Alb  3.8    /  TBili  0.8    /  DBili  x      /  AST  84     /  ALT  131    /  AlkPhos  58     02 May 2019 04:31    Allergies    Itchy throat, Cats (Other)  No Known Drug Allergies    Intolerances

## 2019-05-02 NOTE — DISCHARGE NOTE PROVIDER - NSFOLLOWUPCLINICS_GEN_ALL_ED_FT
[facility] Acute Care Surgery  Acute Care Surgery  [address] [place] [id number]  Phone: [phone]  Fax:   Follow Up Time:

## 2019-05-02 NOTE — DISCHARGE NOTE PROVIDER - HOSPITAL COURSE
48 year old male presenting with chief complaint of abdominal pain x 1 day. Patient seen and examined at bedside with senior resident. Reports that he was awoken from sleep this morning with severe epigastric pain radiating ot RUQ and right back. Pain rated 101/10 and described as sharp, constant. Associated with multiple episodes (more than 5) of non bloody non bilious emesis. ROS associated with subjective fevers and chills. Having normal bowel function (last BM day prior to presentation) ROS negative for dizziness/lightheadedness, chest pain, SOB, dyspnea, dysuria or changes in bowel habits.  Pt noted to have cholelithiasis and sludge on RUQ US.  Mildly dilated CBD to 7mm.  GI consulted for dilated CBD.  Taken for  ERCP on 4/30, removing stones w/ CBD sweep and sphincterotomy performed.    Pt then taken to the OR 5/1 for uncomplicated Lap Analy.  Post operatively pt improved.  Pain well controlled, tolerating diet, voids.  Stable for d/c home today.

## 2019-05-02 NOTE — PROGRESS NOTE ADULT - PROBLEM SELECTOR PROBLEM 1
Symptomatic cholelithiasis
Elevated liver function tests
Symptomatic cholelithiasis

## 2019-05-02 NOTE — PROGRESS NOTE ADULT - PROBLEM SELECTOR PLAN 3
trending down
Almost normalized now.  Patient's symptoms much improved.
s/p repair in early childhood, pulm valve replacement 2013
with hx of pulm valve replacement 2013; followed by cardiology

## 2019-05-02 NOTE — PROGRESS NOTE ADULT - PROVIDER SPECIALTY LIST ADULT
Anesthesia
Cardiology
Family Medicine
Gastroenterology
Gastroenterology
Surgery
Family Medicine

## 2019-05-02 NOTE — DISCHARGE NOTE PROVIDER - NSDCCPCAREPLAN_GEN_ALL_CORE_FT
PRINCIPAL DISCHARGE DIAGNOSIS  Diagnosis: Symptomatic cholelithiasis  Assessment and Plan of Treatment:   BATHING: Please do not submerge wound underwater. You may shower and/or sponge bathe.   ACTIVITY: No heavy lifting or straining. Otherwise, you may return to your usual level of physical activity. If you are taking narcotic pain medication (such as Percocet) DO NOT drive a car, operate machinery or make important decisions.  DIET: Return to your usual diet.  NOTIFY YOUR SURGEON IF: You have any bleeding that does not stop, any pus draining from your wound(s), any fever (over 100.4 F) or chills, persistent nausea/vomiting, persistent diarrhea, or if your pain is not controlled on your discharge pain medications.  FOLLOW-UP: Please follow up with your primary care physician and Acute Care Surgery clinic [phone] in 10-14 days regarding your hospitalization. Call for appointment upon discharge.

## 2019-05-02 NOTE — DISCHARGE NOTE NURSING/CASE MANAGEMENT/SOCIAL WORK - NSDCDPATPORTLINK_GEN_ALL_CORE
You can access the [employer] Patient Portal, offered by [facility], by registering with the following website: [email or web address]

## 2019-05-02 NOTE — PROGRESS NOTE ADULT - PROBLEM SELECTOR PLAN 1
plan is for surgery tomorrow - medically cleared
tolerated ERCP well  cleared by cards and pmd  going for OR tomorrow 5/1 for lap aditi  [employer] rep aware  npo aftermidnight  lovenox ok  incentive spirometer  continue home meds
Patient status ERCP with stone retrieval and sphincterotomy.  Patient had lap cholecystectomy yesterday.  Appears improved.  Appears medically stable for discharge home.  Follow up with my office in 1 week.
Patient with possible CBD stone  - will do EUS in am  - cleared by Cardiology
for cholecystectomy today, medically cleared
for endoscopic ultrasound tomorrow by GI, pt appears medically optimized and stable for procedure

## 2019-05-02 NOTE — PROGRESS NOTE ADULT - REASON FOR ADMISSION
Symptomatic cholelithiasis r/u choledocholithiasis
Symptomatic cholelithiasis r/u choledocholithiasis  Post-op ERCP
Symptomatic cholelithiasis r/u choledocholithiasis

## 2019-05-02 NOTE — DISCHARGE NOTE PROVIDER - CARE PROVIDER_API CALL
Babak Gonzales)  Gastroenterology; Internal Medicine  [address] [place] [address]  Phone: [phone]  Fax: [phone]  Follow Up Time:

## 2019-05-03 LAB — SURGICAL PATHOLOGY STUDY: SIGNIFICANT CHANGE UP

## 2019-05-06 LAB — SURGICAL PATHOLOGY STUDY: SIGNIFICANT CHANGE UP

## 2019-06-07 NOTE — ED STATDOCS - DATE/TIME 1
The goal of treatment of eczema is the patient's comfort. First line of therapy is moisturizer. A product without perfume or color is preferred.  Examples are Cetaphil, Eucerin, Vaseline, Aquaphor, etc. It is best to bathe your child with a mild soap 27-Apr-2019 20:01

## 2020-07-28 NOTE — ED ADULT NURSE NOTE - NS ED NOTE ABUSE RESPONSE YN
[facility] Medicine Progress Note    Patient: Tino Frias Date: 2020   : 1973 Attending: Edson Lin MD   46 year old male      Chief Complaint: s/p left BKA     Subjective: Sitting at bedside w/ CNA getting ready to transfer to chair. Having some pain to L BKA site, currently 4-5/10. Reports feeling more depressed lately about his health issues. Interested in discussing while in the hospital. Denies dizziness, chest pain, dyspnea, nausea, vomiting. Appetite is good. Reports LBM on .     Medications/Infusions: Reviewed    Review of Systems: A 10 point comprehensive review of systems was negative other than those pertinent positives/negatives as noted in subjective.                 Vital Last Value 24 Hour Range   Temperature 97.9 °F (36.6 °C) (20 1324) Temp  Min: 97.9 °F (36.6 °C)  Max: 98.8 °F (37.1 °C)   Pulse 97 (20 1324) Pulse  Min: 93  Max: 102   Respiratory 18 (20 1324) Resp  Min: 16  Max: 18   Non-Invasive  Blood Pressure 132/85 (20 1324) BP  Min: 124/71  Max: 133/85   Pulse Oximetry 95 % (20 1324) SpO2  Min: 95 %  Max: 99 %     Vital Today Admitted   Weight 129.9 kg (20 1936) Weight: 131.4 kg (20 0507)   Height N/A Height: 6' 1\" (185.4 cm) (20 0507)   BMI N/A BMI (Calculated): 38.22 (20 0507)       Intake/Output:      Intake/Output Summary (Last 24 hours) at 2020 1350  Last data filed at 2020 1325  Gross per 24 hour   Intake 240 ml   Output 4000 ml   Net -3760 ml       Physical Exam:   Constitutional: Well developed, well nourished, fatigued but in no acute distress, non-toxic appearance.   HENT: Atraumatic, normocephalic, external ears and nose appear normal, oropharynx moist and clear.   Respiratory: No respiratory distress, normal breath sounds, no rales, no wheezing, no rhonchi   Cardiovascular: Normal rate, normal rhythm, no murmurs. Strong pedal pulse. Non pitting right lower extremity edema.Wearing tubi- on  RLE  GI: Soft, nondistended, normal bowel sounds, nontender. No hepatosplenomegaly.   :  Whittaker in place draining yellow urine  Musculoskeletal: Left BKA, dressing CDI. Wearing L knee immobilizer and VAC in place. Able to lift Left limb off bed, good strength in RLE  Skin: Well hydrated, no rash. No areas of induration.   Neurologic: No focal deficits noted  Psychiatric: A&Ox4. Flat affect, seems a bit down about current health status    Laboratory Results:   Recent Labs   Lab 07/24/20  0459 07/23/20  0512   WBC 7.0 8.0   HCT 37.3* 39.3   HGB 11.5* 12.3*    151   SODIUM 137 137   POTASSIUM 4.6 4.7   CHLORIDE 103 103   CO2 27 29   CALCIUM 9.1 9.0   GLUCOSE 225* 149*   BUN 25* 22*   CREATININE 1.20* 1.13       Assessment:  1. Chronic left lower extremity infections with osteomyelitis now s/p BKA on 07/22/2020  2. Essential hypertension  3. Diabetes mellitus type 2  4. Multiple sclerosis  5. Neurogenic bladder with suprapubic catheter   6. Seizure disorder  7. Hypothyroidism  8. GERD  9. Morbid obesity  10. Depression    Plan:   -blood sugars improved now, continue lantus 55 units, humalog 5 units + increased SSI doses, hypoglycemia protocol  - monitor BS's closely  - PRN pain meds  - PTA meds resumed  -continue good bowel regimen, encourage PO intake  -wound care following   -post-op management per ortho   - Encourage participation in PT/OT   - D/W Behavioral Health NP about possible inpatient psycho-therapy. She will f/u with patient and he will also be seen by IRP psychologist once transferred  -discharge planning, IRP accepted, waiting for insurance auth           HADLEY Waters  [facility] Medicine  [phone] between [id number], after 1900 please call the on-call pager [phone]  7/28/2020  1:50 PM             Yes

## 2021-02-09 ENCOUNTER — EMERGENCY (EMERGENCY)
Facility: HOSPITAL | Age: 51
LOS: 1 days | Discharge: HOME | End: 2021-02-09
Attending: EMERGENCY MEDICINE
Payer: COMMERCIAL

## 2021-02-09 VITALS
DIASTOLIC BLOOD PRESSURE: 80 MMHG | TEMPERATURE: 98 F | WEIGHT: 214.95 LBS | RESPIRATION RATE: 18 BRPM | OXYGEN SATURATION: 96 % | HEIGHT: 66 IN | HEART RATE: 73 BPM | SYSTOLIC BLOOD PRESSURE: 138 MMHG

## 2021-02-09 DIAGNOSIS — Z95.0 PRESENCE OF CARDIAC PACEMAKER: Chronic | ICD-10-CM

## 2021-02-09 DIAGNOSIS — Z95.2 PRESENCE OF PROSTHETIC HEART VALVE: Chronic | ICD-10-CM

## 2021-02-09 LAB
APPEARANCE UR: CLEAR — SIGNIFICANT CHANGE UP
APTT BLD: 37.3 SEC — HIGH (ref 27.5–35.5)
BASOPHILS # BLD AUTO: 0.05 K/UL — SIGNIFICANT CHANGE UP (ref 0–0.2)
BASOPHILS NFR BLD AUTO: 0.7 % — SIGNIFICANT CHANGE UP (ref 0–2)
BILIRUB UR-MCNC: NEGATIVE — SIGNIFICANT CHANGE UP
BLD GP AB SCN SERPL QL: SIGNIFICANT CHANGE UP
COLOR SPEC: YELLOW — SIGNIFICANT CHANGE UP
DIFF PNL FLD: ABNORMAL
EOSINOPHIL # BLD AUTO: 0.2 K/UL — SIGNIFICANT CHANGE UP (ref 0–0.5)
EOSINOPHIL NFR BLD AUTO: 2.6 % — SIGNIFICANT CHANGE UP (ref 0–6)
EPI CELLS # UR: SIGNIFICANT CHANGE UP
GLUCOSE UR QL: NEGATIVE MG/DL — SIGNIFICANT CHANGE UP
HCT VFR BLD CALC: 45.8 % — SIGNIFICANT CHANGE UP (ref 39–50)
HGB BLD-MCNC: 15.4 G/DL — SIGNIFICANT CHANGE UP (ref 13–17)
IMM GRANULOCYTES NFR BLD AUTO: 0.4 % — SIGNIFICANT CHANGE UP (ref 0–1.5)
INR BLD: 1.15 RATIO — SIGNIFICANT CHANGE UP (ref 0.88–1.16)
KETONES UR-MCNC: NEGATIVE — SIGNIFICANT CHANGE UP
LEUKOCYTE ESTERASE UR-ACNC: NEGATIVE — SIGNIFICANT CHANGE UP
LYMPHOCYTES # BLD AUTO: 1.85 K/UL — SIGNIFICANT CHANGE UP (ref 1–3.3)
LYMPHOCYTES # BLD AUTO: 24.5 % — SIGNIFICANT CHANGE UP (ref 13–44)
MCHC RBC-ENTMCNC: 29.7 PG — SIGNIFICANT CHANGE UP (ref 27–34)
MCHC RBC-ENTMCNC: 33.6 GM/DL — SIGNIFICANT CHANGE UP (ref 32–36)
MCV RBC AUTO: 88.2 FL — SIGNIFICANT CHANGE UP (ref 80–100)
MONOCYTES # BLD AUTO: 0.62 K/UL — SIGNIFICANT CHANGE UP (ref 0–0.9)
MONOCYTES NFR BLD AUTO: 8.2 % — SIGNIFICANT CHANGE UP (ref 2–14)
NEUTROPHILS # BLD AUTO: 4.8 K/UL — SIGNIFICANT CHANGE UP (ref 1.8–7.4)
NEUTROPHILS NFR BLD AUTO: 63.6 % — SIGNIFICANT CHANGE UP (ref 43–77)
NITRITE UR-MCNC: NEGATIVE — SIGNIFICANT CHANGE UP
PH UR: 7 — SIGNIFICANT CHANGE UP (ref 5–8)
PLATELET # BLD AUTO: 186 K/UL — SIGNIFICANT CHANGE UP (ref 150–400)
PROT UR-MCNC: NEGATIVE MG/DL — SIGNIFICANT CHANGE UP
PROTHROM AB SERPL-ACNC: 13.3 SEC — SIGNIFICANT CHANGE UP (ref 10.6–13.6)
RBC # BLD: 5.19 M/UL — SIGNIFICANT CHANGE UP (ref 4.2–5.8)
RBC # FLD: 13.2 % — SIGNIFICANT CHANGE UP (ref 10.3–14.5)
RBC CASTS # UR COMP ASSIST: SIGNIFICANT CHANGE UP /HPF (ref 0–4)
SP GR SPEC: 1 — LOW (ref 1.01–1.02)
UROBILINOGEN FLD QL: NEGATIVE MG/DL — SIGNIFICANT CHANGE UP
WBC # BLD: 7.55 K/UL — SIGNIFICANT CHANGE UP (ref 3.8–10.5)
WBC # FLD AUTO: 7.55 K/UL — SIGNIFICANT CHANGE UP (ref 3.8–10.5)
WBC UR QL: NEGATIVE — SIGNIFICANT CHANGE UP

## 2021-02-09 PROCEDURE — 74177 CT ABD & PELVIS W/CONTRAST: CPT | Mod: 26

## 2021-02-09 PROCEDURE — 99285 EMERGENCY DEPT VISIT HI MDM: CPT

## 2021-02-09 RX ORDER — ONDANSETRON 8 MG/1
4 TABLET, FILM COATED ORAL ONCE
Refills: 0 | Status: COMPLETED | OUTPATIENT
Start: 2021-02-09 | End: 2021-02-09

## 2021-02-09 RX ORDER — KETOROLAC TROMETHAMINE 30 MG/ML
15 SYRINGE (ML) INJECTION ONCE
Refills: 0 | Status: DISCONTINUED | OUTPATIENT
Start: 2021-02-09 | End: 2021-02-09

## 2021-02-09 RX ORDER — MORPHINE SULFATE 50 MG/1
4 CAPSULE, EXTENDED RELEASE ORAL ONCE
Refills: 0 | Status: DISCONTINUED | OUTPATIENT
Start: 2021-02-09 | End: 2021-02-09

## 2021-02-09 RX ADMIN — MORPHINE SULFATE 4 MILLIGRAM(S): 50 CAPSULE, EXTENDED RELEASE ORAL at 21:25

## 2021-02-09 RX ADMIN — ONDANSETRON 4 MILLIGRAM(S): 8 TABLET, FILM COATED ORAL at 21:26

## 2021-02-09 RX ADMIN — Medication 15 MILLIGRAM(S): at 21:25

## 2021-02-09 NOTE — ED ADULT NURSE NOTE - OBJECTIVE STATEMENT
Assumed care of the Pt AOx4 in no acute distress. Pt states he is having ABD pain 8 out of 10. Pt denies and N/V/D. states he has had similar symptoms before. Pt breathing is even and unlabored, labs sent as per orders, meds given as per order. Pt pending CT scan will continue to monitor.

## 2021-02-09 NOTE — ED ADULT TRIAGE NOTE - CHIEF COMPLAINT QUOTE
C/o lower back pain x4 day and lower abdominal pain x2 days. Describes pain as intermittent stabbing. Denies chest pain, sob, fever, chills, nausea, vomiting, diarrhea, blood stools. Pt states he took GasX with no relief. Hx of Diverticulitis.

## 2021-02-09 NOTE — ED ADULT TRIAGE NOTE - LOCATION:
Cerebrovascular accident    Diabetes mellitus    GERD (gastroesophageal reflux disease)    Hyperlipidemia    Hypertension Right arm;

## 2021-02-10 VITALS
OXYGEN SATURATION: 98 % | TEMPERATURE: 97 F | HEART RATE: 67 BPM | RESPIRATION RATE: 18 BRPM | SYSTOLIC BLOOD PRESSURE: 91 MMHG | DIASTOLIC BLOOD PRESSURE: 58 MMHG

## 2021-02-10 PROBLEM — K57.92 DIVERTICULITIS OF INTESTINE, PART UNSPECIFIED, WITHOUT PERFORATION OR ABSCESS WITHOUT BLEEDING: Chronic | Status: ACTIVE | Noted: 2019-04-28

## 2021-02-10 PROBLEM — Q21.3 TETRALOGY OF FALLOT: Chronic | Status: ACTIVE | Noted: 2019-04-28

## 2021-02-10 LAB
CULTURE RESULTS: NO GROWTH — SIGNIFICANT CHANGE UP
SPECIMEN SOURCE: SIGNIFICANT CHANGE UP

## 2021-02-10 PROCEDURE — 80053 COMPREHEN METABOLIC PANEL: CPT

## 2021-02-10 PROCEDURE — 96375 TX/PRO/DX INJ NEW DRUG ADDON: CPT

## 2021-02-10 PROCEDURE — 87086 URINE CULTURE/COLONY COUNT: CPT

## 2021-02-10 PROCEDURE — 85730 THROMBOPLASTIN TIME PARTIAL: CPT

## 2021-02-10 PROCEDURE — 99284 EMERGENCY DEPT VISIT MOD MDM: CPT | Mod: 25

## 2021-02-10 PROCEDURE — 36415 COLL VENOUS BLD VENIPUNCTURE: CPT

## 2021-02-10 PROCEDURE — 86850 RBC ANTIBODY SCREEN: CPT

## 2021-02-10 PROCEDURE — 82550 ASSAY OF CK (CPK): CPT

## 2021-02-10 PROCEDURE — 96374 THER/PROPH/DIAG INJ IV PUSH: CPT | Mod: XU

## 2021-02-10 PROCEDURE — 83690 ASSAY OF LIPASE: CPT

## 2021-02-10 PROCEDURE — 86901 BLOOD TYPING SEROLOGIC RH(D): CPT

## 2021-02-10 PROCEDURE — 86900 BLOOD TYPING SEROLOGIC ABO: CPT

## 2021-02-10 PROCEDURE — 81001 URINALYSIS AUTO W/SCOPE: CPT

## 2021-02-10 PROCEDURE — 74177 CT ABD & PELVIS W/CONTRAST: CPT

## 2021-02-10 PROCEDURE — 85025 COMPLETE CBC W/AUTO DIFF WBC: CPT

## 2021-02-10 PROCEDURE — 96376 TX/PRO/DX INJ SAME DRUG ADON: CPT

## 2021-02-10 PROCEDURE — 85610 PROTHROMBIN TIME: CPT

## 2021-02-10 RX ORDER — KETOROLAC TROMETHAMINE 30 MG/ML
15 SYRINGE (ML) INJECTION ONCE
Refills: 0 | Status: DISCONTINUED | OUTPATIENT
Start: 2021-02-10 | End: 2021-02-10

## 2021-02-10 RX ORDER — ONDANSETRON 8 MG/1
4 TABLET, FILM COATED ORAL ONCE
Refills: 0 | Status: COMPLETED | OUTPATIENT
Start: 2021-02-10 | End: 2021-02-10

## 2021-02-10 RX ORDER — IBUPROFEN 200 MG
1 TABLET ORAL
Qty: 20 | Refills: 0
Start: 2021-02-10 | End: 2021-02-14

## 2021-02-10 RX ORDER — ONDANSETRON 8 MG/1
1 TABLET, FILM COATED ORAL
Qty: 16 | Refills: 0
Start: 2021-02-10 | End: 2021-02-13

## 2021-02-10 RX ORDER — METRONIDAZOLE 500 MG
1 TABLET ORAL
Qty: 20 | Refills: 0
Start: 2021-02-10 | End: 2021-02-19

## 2021-02-10 RX ORDER — METRONIDAZOLE 500 MG
500 TABLET ORAL ONCE
Refills: 0 | Status: COMPLETED | OUTPATIENT
Start: 2021-02-10 | End: 2021-02-10

## 2021-02-10 RX ADMIN — ONDANSETRON 4 MILLIGRAM(S): 8 TABLET, FILM COATED ORAL at 01:11

## 2021-02-10 RX ADMIN — Medication 15 MILLIGRAM(S): at 01:11

## 2021-02-10 RX ADMIN — Medication 500 MILLIGRAM(S): at 01:11

## 2021-02-10 RX ADMIN — Medication 1 TABLET(S): at 01:11

## 2021-02-10 NOTE — ED PROVIDER NOTE - CLINICAL SUMMARY MEDICAL DECISION MAKING FREE TEXT BOX
PT with stable VS, no acute distress, non toxic appearing, tolerating PO in the ED, Pt with no s/s of systemic infection, pain controlled, uncomplicated diverticulitis, Pt to be dc home with ABx, follow up to PCP, GI, educated about when to return to the ED if needed. PT verbalizes that he understands all instructions and results. Pt infomred that ED is open and availible 24/7 365 days a yr, encouraged to return to the ED if they have any change in condition, or feel the need for revaluation.

## 2021-02-10 NOTE — ED PROVIDER NOTE - CARE PROVIDER_API CALL
Babak Gonzales)  Gastroenterology; Internal Medicine  [address] [place] [address]  Phone: [phone]  Fax: [phone]  Follow Up Time:

## 2021-02-10 NOTE — ED PROVIDER NOTE - PATIENT PORTAL LINK FT
You can access the FollowMyHealth Patient Portal offered by [facility] by registering at the following website: [email or web address]. By joining [facility]’s FollowMyHealth portal, you will also be able to view your health information using other applications (apps) compatible with our system.

## 2021-02-10 NOTE — ED PROVIDER NOTE - ADDITIONAL NOTES AND INSTRUCTIONS:
PT was evaluated At [facility] ED and was found to have a condition that warranted time of to rest and heal from WORK/SCHOOL.   Zain Mosre PA-C

## 2021-02-10 NOTE — ED PROVIDER NOTE - NS ED ROS FT
ROS: CONTUSIONAL: Denies fever, chills, fatigue, wt loss. HEAD: Denies trauma, HA, Dizziness. EYE: Denies Acute visual changes, diplopia. ENMT: Denies change in hearing, tinnitus, epistaxis, difficulty swallowing, sore throat. CARDIO: Denies CP, palpitations, edema. RESP: Denies Cough, SOB , Diff breathing, hemoptysis. GI: +ABD pain, Denies N/V,  change in bowel movement. URINARY: Denies difficulty urinating, pelvic pain. MS:  Denies joint pain, back pain, weakness, decreased ROM, swelling. NEURO: Denies change in gait, seizures, loss of sensation, dizziness, confusion LOC.  PSY: NO SI/HI.

## 2021-02-10 NOTE — ED PROVIDER NOTE - ATTENDING CONTRIBUTION TO CARE
Marcelino: I performed a face to face bedside interview with patient regarding history of present illness, review of symptoms and past medical history. I completed an independent physical exam.  I have discussed patient's plan of care with advanced care provider.   I agree with note as stated above including HISTORY OF PRESENT ILLNESS, HIV, PAST MEDICAL/SURGICAL/FAMILY/SOCIAL HISTORY, ALLERGIES AND HOME MEDICATIONS, REVIEW OF SYSTEMS, PHYSICAL EXAM, MEDICAL DECISION MAKING and any PROGRESS NOTES during the time I functioned as the attending physician for this patient  unless otherwise noted. My brief assessment is as follows: 50M h/o diverticulitis p/w lower abd pain x 4 days, similar to prior diverticulitis. Denies fever/chills, diarrhea, CP, SOB.   Exam notable for LLQ ttp, no rebound/guarding.   Likely diverticulitis, plan for pain control, labs, IVF, CTAP, reassess.

## 2021-02-10 NOTE — ED PROVIDER NOTE - NSFOLLOWUPINSTRUCTIONS_ED_ALL_ED_FT
Patient education: Diverticulitis (The Basics)  View in Malagasy  Written by the doctors and editors at [facility]  What is diverticulitis?  Diverticulitis is a disorder that can cause belly pain, fever, and problems with bowel movements.    The food we eat travels from the stomach through a long tube called the intestine. The last part of that tube is the colon (figure 1). The colon sometimes has small pouches in its walls. These pouches are called "diverticula." Many people who have these pouches have no symptoms. Diverticulitis happens when these pouches develop a small tear also known as a "microperforation," which then become infected and cause symptoms.    What are the symptoms of diverticulitis?  The most common symptom of diverticulitis is pain, which is usually in the lower part of the belly. Other symptoms can include:    ?Fever    ?Constipation    ?Diarrhea    ?Nausea and vomiting    Is there a test for diverticulitis?  Yes. There are a few tests your doctor can do to find out if you have diverticulitis. But tests are not always needed. If you do have a test, you might have a:    ?CT scan – A CT scan is a kind of imaging test. Imaging tests create pictures of the inside of your body.    ?Abdominal ultrasound – This test uses sound waves to create pictures of your intestines.    How is diverticulitis treated?  Diverticulitis is typically treated with antibiotics. You might also need to go on a clear liquid diet for a short time. If you only have mild symptoms, this might be all the treatment you need.    But if you have severe symptoms, or if you get a fever, you might need to stay in the hospital. There, you can get fluids and antibiotics through a thin tube that goes into your vein, called an "IV." That way you can stop eating and drinking until you get better.    If you have a serious infection, the doctor might put a tube into your belly to drain the infection. In very bad cases, people need surgery to remove the part of the colon that is affected.    A few months after your infection has been treated, your doctor might recommend that you have a procedure called a colonoscopy (figure 2). During a colonoscopy, the doctor can look directly inside your colon to get an idea of the number of diverticula in your colon and to find out where they are. At the same time, they can check for signs of cancer.    Should I change my diet if I have had diverticulitis?  If you have had diverticulitis, it's a good idea to eat a lot of fiber. Good sources of fiber include fruits, oats, beans, peas, and green leafy vegetables. If you do not already eat fiber-rich foods, wait until after your symptoms get better to start.    You do not need to avoid seeds, nuts, popcorn, or other similar foods.

## 2021-02-10 NOTE — ED PROVIDER NOTE - OBJECTIVE STATEMENT
PT with SPMHX of diverticulitis, [facility] presents to the ED with complaint of lower abd pain x4 days. Pt states that he had a gradual onset of pain that has been constant since onset, sever stabbing,  radiating to both flanks, has gotten progressively worse, made worse with eating improved by nothing. Pt states that he spoke with his PCP who refereed him to the ED. Pt dines fever, chills, weakness, numbness, tingling, HA, dizziness, SOB ,diff breathing, constipation, diarrhea, bloody or black stools.

## 2021-02-10 NOTE — ED ADULT NURSE REASSESSMENT NOTE - NS ED NURSE REASSESS COMMENT FT1
JOAQUIM Morse made aware of BP no orders at this time Pt stable no complaints for RN will continue to monitor.

## 2021-05-26 ENCOUNTER — APPOINTMENT (OUTPATIENT)
Dept: GASTROENTEROLOGY | Facility: CLINIC | Age: 51
End: 2021-05-26
Payer: COMMERCIAL

## 2021-05-26 VITALS
HEIGHT: 66 IN | DIASTOLIC BLOOD PRESSURE: 81 MMHG | OXYGEN SATURATION: 99 % | SYSTOLIC BLOOD PRESSURE: 104 MMHG | WEIGHT: 220 LBS | HEART RATE: 64 BPM | BODY MASS INDEX: 35.36 KG/M2 | TEMPERATURE: 97.8 F | RESPIRATION RATE: 14 BRPM

## 2021-05-26 DIAGNOSIS — Z09 ENCOUNTER FOR FOLLOW-UP EXAMINATION AFTER COMPLETED TREATMENT FOR CONDITIONS OTHER THAN MALIGNANT NEOPLASM: ICD-10-CM

## 2021-05-26 DIAGNOSIS — Z78.9 OTHER SPECIFIED HEALTH STATUS: ICD-10-CM

## 2021-05-26 PROCEDURE — 99214 OFFICE O/P EST MOD 30 MIN: CPT

## 2021-05-26 PROCEDURE — 99072 ADDL SUPL MATRL&STAF TM PHE: CPT

## 2021-05-26 RX ORDER — LOSARTAN POTASSIUM 50 MG/1
50 TABLET, FILM COATED ORAL
Refills: 0 | Status: ACTIVE | COMMUNITY

## 2021-05-26 RX ORDER — ASPIRIN 81 MG
81 TABLET, DELAYED RELEASE (ENTERIC COATED) ORAL
Refills: 0 | Status: ACTIVE | COMMUNITY

## 2021-05-26 RX ORDER — SODIUM SULFATE, POTASSIUM SULFATE, MAGNESIUM SULFATE 17.5; 3.13; 1.6 G/ML; G/ML; G/ML
17.5-3.13-1.6 SOLUTION, CONCENTRATE ORAL
Qty: 1 | Refills: 0 | Status: ACTIVE | COMMUNITY
Start: 2021-05-26 | End: 1900-01-01

## 2021-05-26 RX ORDER — METOPROLOL TARTRATE 50 MG/1
50 TABLET, FILM COATED ORAL
Refills: 0 | Status: ACTIVE | COMMUNITY

## 2021-05-26 NOTE — ASSESSMENT
[FreeTextEntry1] : I have discussed the need for fiber supplementation on a daily basis to prevent any diverticulitis.  I have also recommended to consider colonoscopy especially as he had recurrent diverticulitis and the last colonoscopy was about 5 years ago.  We will need a cardiology clearance and we will perform the colonoscopy in the [facility].\par \par The bowel preparation was discussed at length. Risks (including bleeding, pain, perforation, incomplete examination, splenic laceration, adverse reactions to medications, aspiration and death), benefits and alternatives were discussed. Patient is agreeable for the colonoscopy. The patient is medically optimized for the procedure. We will schedule the patient for the procedure. Bowel preparation was sent to the pharmacy.\par \par Babak Gonzales MD\par Gastroenterology \par \par

## 2021-05-26 NOTE — PHYSICAL EXAM
[General Appearance - Alert] : alert [General Appearance - In No Acute Distress] : in no acute distress [Sclera] : the sclera and conjunctiva were normal [PERRL With Normal Accommodation] : pupils were equal in size, round, and reactive to light [Extraocular Movements] : extraocular movements were intact [Outer Ear] : the ears and nose were normal in appearance [Oropharynx] : the oropharynx was normal [Neck Appearance] : the appearance of the neck was normal [Neck Cervical Mass (___cm)] : no neck mass was observed [Jugular Venous Distention Increased] : there was no jugular-venous distention [Thyroid Diffuse Enlargement] : the thyroid was not enlarged [Thyroid Nodule] : there were no palpable thyroid nodules [Auscultation Breath Sounds / Voice Sounds] : lungs were clear to auscultation bilaterally [Heart Rate And Rhythm] : heart rate was normal and rhythm regular [Heart Sounds] : normal S1 and S2 [Heart Sounds Gallop] : no gallops [Murmurs] : no murmurs [Heart Sounds Pericardial Friction Rub] : no pericardial rub [Full Pulse] : the pedal pulses are present [Edema] : there was no peripheral edema [Bowel Sounds] : normal bowel sounds [Abdomen Soft] : soft [Abdomen Tenderness] : non-tender [Abdomen Mass (___ Cm)] : no abdominal mass palpated [Cervical Lymph Nodes Enlarged Posterior Bilaterally] : posterior cervical [Cervical Lymph Nodes Enlarged Anterior Bilaterally] : anterior cervical [Supraclavicular Lymph Nodes Enlarged Bilaterally] : supraclavicular [No CVA Tenderness] : no ~M costovertebral angle tenderness [No Spinal Tenderness] : no spinal tenderness [Abnormal Walk] : normal gait [Nail Clubbing] : no clubbing  or cyanosis of the fingernails [Musculoskeletal - Swelling] : no joint swelling seen [Motor Tone] : muscle strength and tone were normal [Skin Color & Pigmentation] : normal skin color and pigmentation [Skin Turgor] : normal skin turgor [] : no rash [No Focal Deficits] : no focal deficits [Oriented To Time, Place, And Person] : oriented to person, place, and time [Impaired Insight] : insight and judgment were intact [Affect] : the affect was normal [FreeTextEntry1] : AICD in place

## 2021-05-26 NOTE — HISTORY OF PRESENT ILLNESS
[_________] : Performed [unfilled] [Test: ___] : [unfilled] [de-identified] : The patient arrived for consultation.  He has been evaluated in 2019 for choledocholithiasis.  He underwent EUS and ERCP and biliary sphincterotomy with balloon sweep extraction of bile duct stones were performed.  He has history of tetralogy of Fallot requiring surgery.  He does have history of AICD placement in 2013.  He is now following up after episode of diverticulitis.  He had previous episode in 2017 and may be 2018.  She he had a colonoscopy in 2016.  He does not recall any significant findings.  He does not take any fiber supplementation on a daily basis.  Recent blood work was normal.  He follows up regularly with a cardiologist.  His last echo was about 2 to 3 months ago. [de-identified] : Normal as per patient [de-identified] : Normal [FreeTextEntry1] :   EXAM: CT ABDOMEN AND PELVIS IC  PROCEDURE DATE: 02/09/2021    INTERPRETATION: CLINICAL INFORMATION: Lower abdominal pain  TECHNIQUE: CT imaging of the abdomen and pelvis was performed with IV contrast. 90 mL of Omnipaque 350 was injected intravenously. 10 mL was discarded.  COMPARISON: 2/13/2019  FINDINGS:   LOWER CHEST: Cardiomegaly. Pacemaker wires.  LIVER: Hepatic steatosis. BILE DUCTS: normal caliber. GALLBLADDER: Surgically absent. PANCREAS: within normal limits. SPLEEN: within normal limits.  ADRENALS: within normal limits. KIDNEYS, URETERS AND BLADDER: within normal limits.  REPRODUCTIVE ORGANS: No pelvic masses. No free fluid.No pelvic lymphadenopathy.  BOWEL: Diffuse colonic diverticulosis. Peridiverticular stranding in the sigmoid colon compatible with acute diverticulitis. No bowel obstruction. Normal appendix.  PERITONEUM: no ascites or free air, no fluid collection.  VESSELS: Within normal limits. RETROPERITONEUM: Within normal limits.  ABDOMINAL WALL: Within normal limits. BONES: Median sternotomy wires. Degenerative changes of the spine.  IMPRESSION:  Acute diverticulitis involving the sigmoid colon.

## 2021-06-30 ENCOUNTER — OUTPATIENT (OUTPATIENT)
Dept: OUTPATIENT SERVICES | Facility: HOSPITAL | Age: 51
LOS: 1 days | End: 2021-06-30
Payer: COMMERCIAL

## 2021-06-30 ENCOUNTER — APPOINTMENT (OUTPATIENT)
Dept: RADIOLOGY | Facility: CLINIC | Age: 51
End: 2021-06-30
Payer: COMMERCIAL

## 2021-06-30 DIAGNOSIS — Z95.2 PRESENCE OF PROSTHETIC HEART VALVE: Chronic | ICD-10-CM

## 2021-06-30 DIAGNOSIS — Z95.0 PRESENCE OF CARDIAC PACEMAKER: Chronic | ICD-10-CM

## 2021-06-30 DIAGNOSIS — K57.32 DIVERTICULITIS OF LARGE INTESTINE WITHOUT PERFORATION OR ABSCESS WITHOUT BLEEDING: ICD-10-CM

## 2021-06-30 PROCEDURE — 71046 X-RAY EXAM CHEST 2 VIEWS: CPT | Mod: 26

## 2021-06-30 PROCEDURE — 71046 X-RAY EXAM CHEST 2 VIEWS: CPT

## 2021-07-01 ENCOUNTER — APPOINTMENT (OUTPATIENT)
Dept: GASTROENTEROLOGY | Facility: HOSPITAL | Age: 51
End: 2021-07-01

## 2021-10-31 DIAGNOSIS — Z01.818 ENCOUNTER FOR OTHER PREPROCEDURAL EXAMINATION: ICD-10-CM

## 2021-11-01 ENCOUNTER — APPOINTMENT (OUTPATIENT)
Dept: DISASTER EMERGENCY | Facility: CLINIC | Age: 51
End: 2021-11-01

## 2021-11-02 LAB — SARS-COV-2 N GENE NPH QL NAA+PROBE: NOT DETECTED

## 2021-11-04 ENCOUNTER — OUTPATIENT (OUTPATIENT)
Dept: OUTPATIENT SERVICES | Facility: HOSPITAL | Age: 51
LOS: 1 days | End: 2021-11-04
Payer: COMMERCIAL

## 2021-11-04 ENCOUNTER — TRANSCRIPTION ENCOUNTER (OUTPATIENT)
Age: 51
End: 2021-11-04

## 2021-11-04 ENCOUNTER — RESULT REVIEW (OUTPATIENT)
Age: 51
End: 2021-11-04

## 2021-11-04 ENCOUNTER — APPOINTMENT (OUTPATIENT)
Dept: GASTROENTEROLOGY | Facility: HOSPITAL | Age: 51
End: 2021-11-04

## 2021-11-04 DIAGNOSIS — K57.32 DIVERTICULITIS OF LARGE INTESTINE WITHOUT PERFORATION OR ABSCESS WITHOUT BLEEDING: ICD-10-CM

## 2021-11-04 DIAGNOSIS — K57.32 DIVERTICULITIS OF LARGE INTESTINE W/OUT PERFORATION OR ABSCESS W/OUT BLEEDING: ICD-10-CM

## 2021-11-04 DIAGNOSIS — Z95.0 PRESENCE OF CARDIAC PACEMAKER: Chronic | ICD-10-CM

## 2021-11-04 DIAGNOSIS — Z95.2 PRESENCE OF PROSTHETIC HEART VALVE: Chronic | ICD-10-CM

## 2021-11-04 PROCEDURE — 45385 COLONOSCOPY W/LESION REMOVAL: CPT | Mod: PT

## 2021-11-04 PROCEDURE — 45380 COLONOSCOPY AND BIOPSY: CPT | Mod: 59

## 2021-11-04 PROCEDURE — 45385 COLONOSCOPY W/LESION REMOVAL: CPT

## 2021-11-04 PROCEDURE — C1889: CPT

## 2021-11-04 PROCEDURE — 45381 COLONOSCOPY SUBMUCOUS NJX: CPT | Mod: PT

## 2021-11-04 PROCEDURE — 45381 COLONOSCOPY SUBMUCOUS NJX: CPT | Mod: 59

## 2021-11-04 PROCEDURE — 88305 TISSUE EXAM BY PATHOLOGIST: CPT

## 2021-11-04 PROCEDURE — 45380 COLONOSCOPY AND BIOPSY: CPT | Mod: PT,XS

## 2021-11-04 PROCEDURE — 88305 TISSUE EXAM BY PATHOLOGIST: CPT | Mod: 26

## 2021-11-04 NOTE — PHYSICAL EXAM
[General Appearance - Alert] : alert [General Appearance - In No Acute Distress] : in no acute distress [Sclera] : the sclera and conjunctiva were normal [PERRL With Normal Accommodation] : pupils were equal in size, round, and reactive to light [Extraocular Movements] : extraocular movements were intact [Outer Ear] : the ears and nose were normal in appearance [Oropharynx] : the oropharynx was normal [Neck Appearance] : the appearance of the neck was normal [Neck Cervical Mass (___cm)] : no neck mass was observed [Jugular Venous Distention Increased] : there was no jugular-venous distention [Thyroid Diffuse Enlargement] : the thyroid was not enlarged [Thyroid Nodule] : there were no palpable thyroid nodules [Auscultation Breath Sounds / Voice Sounds] : lungs were clear to auscultation bilaterally [Heart Rate And Rhythm] : heart rate was normal and rhythm regular [Heart Sounds] : normal S1 and S2 [Heart Sounds Gallop] : no gallops [Murmurs] : no murmurs [Heart Sounds Pericardial Friction Rub] : no pericardial rub [FreeTextEntry1] : AICD in place [Full Pulse] : the pedal pulses are present [Edema] : there was no peripheral edema [Bowel Sounds] : normal bowel sounds [Abdomen Soft] : soft [Abdomen Tenderness] : non-tender [Abdomen Mass (___ Cm)] : no abdominal mass palpated [Cervical Lymph Nodes Enlarged Posterior Bilaterally] : posterior cervical [Cervical Lymph Nodes Enlarged Anterior Bilaterally] : anterior cervical [Supraclavicular Lymph Nodes Enlarged Bilaterally] : supraclavicular [No CVA Tenderness] : no ~M costovertebral angle tenderness [No Spinal Tenderness] : no spinal tenderness [Abnormal Walk] : normal gait [Nail Clubbing] : no clubbing  or cyanosis of the fingernails [Musculoskeletal - Swelling] : no joint swelling seen [Motor Tone] : muscle strength and tone were normal [Skin Color & Pigmentation] : normal skin color and pigmentation [Skin Turgor] : normal skin turgor [] : no rash [No Focal Deficits] : no focal deficits [Oriented To Time, Place, And Person] : oriented to person, place, and time [Impaired Insight] : insight and judgment were intact [Affect] : the affect was normal

## 2021-11-08 LAB — SURGICAL PATHOLOGY STUDY: SIGNIFICANT CHANGE UP

## 2021-11-17 ENCOUNTER — NON-APPOINTMENT (OUTPATIENT)
Age: 51
End: 2021-11-17

## 2022-03-19 ENCOUNTER — APPOINTMENT (OUTPATIENT)
Dept: RADIOLOGY | Facility: CLINIC | Age: 52
End: 2022-03-19
Payer: COMMERCIAL

## 2022-03-19 ENCOUNTER — OUTPATIENT (OUTPATIENT)
Dept: OUTPATIENT SERVICES | Facility: HOSPITAL | Age: 52
LOS: 1 days | End: 2022-03-19
Payer: COMMERCIAL

## 2022-03-19 DIAGNOSIS — Z95.2 PRESENCE OF PROSTHETIC HEART VALVE: Chronic | ICD-10-CM

## 2022-03-19 DIAGNOSIS — Z95.0 PRESENCE OF CARDIAC PACEMAKER: Chronic | ICD-10-CM

## 2022-03-19 DIAGNOSIS — Z00.8 ENCOUNTER FOR OTHER GENERAL EXAMINATION: ICD-10-CM

## 2022-03-19 DIAGNOSIS — K57.32 DIVERTICULITIS OF LARGE INTESTINE WITHOUT PERFORATION OR ABSCESS WITHOUT BLEEDING: ICD-10-CM

## 2022-03-19 PROCEDURE — 73030 X-RAY EXAM OF SHOULDER: CPT

## 2022-03-19 PROCEDURE — 73030 X-RAY EXAM OF SHOULDER: CPT | Mod: 26,LT

## 2022-04-26 NOTE — DISCHARGE NOTE NURSING/CASE MANAGEMENT/SOCIAL WORK - NSTRANSFERBELONGINGSRESP_GEN_A_NUR
4/26  Vanc random = 8.5 mcg/mL at 1127. Give vancomycin 1500 mg x1. Continue with daily vancomycin levels. Health Care Proxy (HCP) yes

## 2022-08-02 ENCOUNTER — NON-APPOINTMENT (OUTPATIENT)
Age: 52
End: 2022-08-02

## 2023-02-08 NOTE — ED STATDOCS - NS ED MD DISPO ADMITTING SERVICE
Progress Note    Chief Complaint:  weakness  Consultant(s):  nephrology  GI    Subjective:  Feels similar, still very weak  Denies any cp, sob, abd pain    Current Medications:  Current Facility-Administered Medications   Medication Dose Route Frequency Provider Last Rate Last Admin   • aspirin (ECOTRIN) enteric coated tablet 81 mg  81 mg Oral Daily Martina Padilla MD       • atorvastatin (LIPITOR) tablet 80 mg  80 mg Oral Nightly Martina Padilla MD       • cholecalciferol (VITAMIN D) tablet 50 mcg  50 mcg Oral Daily Martina Padilla MD       • diphenoxylate-atropine (LOMOTIL) 2.5-0.025 MG tablet 1 tablet  1 tablet Oral 4x Daily PRN Martina Padilla MD       • finasteride (PROSCAR) tablet 5 mg  5 mg Oral Daily Martina Padilla MD       • pantoprazole (PROTONIX) EC tablet 40 mg  40 mg Oral QAM AC Martina Padilla MD       • tamsulosin (FLOMAX) capsule 0.4 mg  0.4 mg Oral Daily PC Martina Padilla MD       • sodium chloride 0.9% infusion   Intravenous Continuous Mehdi Cruz MD 75 mL/hr at 02/07/23 2151 New Bag at 02/07/23 2151   • sodium chloride 0.9 % flush bag 25 mL  25 mL Intravenous PRN Martina Padilla MD       • sodium chloride (PF) 0.9 % injection 2 mL  2 mL Intracatheter 2 times per day Martina Padilla MD   2 mL at 02/07/23 2151   • cefTRIAXone (ROCEPHIN) syringe 1,000 mg  1,000 mg Intravenous Daily Martina Padilla MD   1,000 mg at 02/08/23 0953   • apixaBAN (ELIQUIS) tablet 2.5 mg  2.5 mg Oral 2 times per day Dionisio Hoover MD   2.5 mg at 02/07/23 2244        Physical Exam  Temp:  [97.7 °F (36.5 °C)-98.2 °F (36.8 °C)] 97.7 °F (36.5 °C)  Heart Rate:  [79-98] 84  Resp:  [15-18] 17  BP: (124-162)/(48-62) 162/61  Physical Exam  Constitutional:       Comments: thin   HENT:      Head: Normocephalic and atraumatic.      Mouth/Throat:      Mouth: Mucous membranes are moist.   Eyes:      Extraocular Movements: Extraocular movements intact.      Conjunctiva/sclera: Conjunctivae normal.   Cardiovascular:      Rate and Rhythm:  Normal rate.      Heart sounds: Normal heart sounds.   Pulmonary:      Effort: Pulmonary effort is normal.      Breath sounds: Normal breath sounds.   Abdominal:      General: Bowel sounds are normal.      Palpations: Abdomen is soft.   Skin:     General: Skin is warm and dry.   Neurological:      General: No focal deficit present.      Mental Status: He is alert. Mental status is at baseline.   Psychiatric:         Mood and Affect: Mood normal.         Behavior: Behavior normal.         Labs  Recent Results (from the past 24 hour(s))   Light Blue Top    Collection Time: 02/07/23  3:52 PM   Result Value Ref Range    Extra Tube Hold for Add Ons    Light Green Top    Collection Time: 02/07/23  3:52 PM   Result Value Ref Range    Extra Tube Hold for Add Ons    Lavender Top    Collection Time: 02/07/23  3:52 PM   Result Value Ref Range    Extra Tube Hold for Add Ons    Gold Top    Collection Time: 02/07/23  3:52 PM   Result Value Ref Range    Extra Tube Hold for Add Ons    Comprehensive Metabolic Panel    Collection Time: 02/07/23  3:52 PM   Result Value Ref Range    Fasting Status      Sodium 133 (L) 135 - 145 mmol/L    Potassium 5.3 (H) 3.4 - 5.1 mmol/L    Chloride 102 97 - 110 mmol/L    Carbon Dioxide 22 21 - 32 mmol/L    Anion Gap 14 7 - 19 mmol/L    Glucose 107 (H) 70 - 99 mg/dL    BUN 59 (H) 6 - 20 mg/dL    Creatinine 2.92 (H) 0.67 - 1.17 mg/dL    Glomerular Filtration Rate 20 (L) >=60    BUN/ Creatinine Ratio 20 7 - 25    Calcium 8.9 8.4 - 10.2 mg/dL    Bilirubin, Total 0.4 0.2 - 1.0 mg/dL    GOT/ (H) <=37 Units/L    GPT/ (H) <64 Units/L    Alkaline Phosphatase 49 45 - 117 Units/L    Albumin 2.8 (L) 3.6 - 5.1 g/dL    Protein, Total 6.7 6.4 - 8.2 g/dL    Globulin 3.9 2.0 - 4.0 g/dL    A/G Ratio 0.7 (L) 1.0 - 2.4   CBC with Automated Differential (performable only)    Collection Time: 02/07/23  3:52 PM   Result Value Ref Range    WBC 7.4 4.2 - 11.0 K/mcL    RBC 2.80 (L) 4.50 - 5.90 mil/mcL    HGB 8.4  (L) 13.0 - 17.0 g/dL    HCT 26.4 (L) 39.0 - 51.0 %    MCV 94.3 78.0 - 100.0 fl    MCH 30.0 26.0 - 34.0 pg    MCHC 31.8 (L) 32.0 - 36.5 g/dL    RDW-CV 16.3 (H) 11.0 - 15.0 %    RDW-SD 57.0 (H) 39.0 - 50.0 fL     140 - 450 K/mcL    NRBC 0 <=0 /100 WBC    Neutrophil, Percent 82 %    Lymphocytes, Percent 9 %    Mono, Percent 8 %    Eosinophils, Percent 1 %    Basophils, Percent 0 %    Immature Granulocytes 0 %    Absolute Neutrophils 6.1 1.8 - 7.7 K/mcL    Absolute Lymphocytes 0.7 (L) 1.0 - 4.0 K/mcL    Absolute Monocytes 0.6 0.3 - 0.9 K/mcL    Absolute Eosinophils  0.1 0.0 - 0.5 K/mcL    Absolute Basophils 0.0 0.0 - 0.3 K/mcL    Absolute Immmature Granulocytes 0.0 0.0 - 0.2 K/mcL   Electrocardiogram 12-Lead    Collection Time: 02/07/23  4:01 PM   Result Value Ref Range    Ventricular Rate EKG/Min (BPM) 75     Atrial Rate (BPM) 75     MT-Interval (MSEC) 154     QRS-Interval (MSEC) 132     QT-Interval (MSEC) 398     QTc 444     P Axis (Degrees) 101     T Axis (Degrees) 97     REPORT TEXT       Normal sinus rhythm  Left bundle branch block  Abnormal ECG  When compared with ECG of  10-NOV-2020 15:35,  Left bundle branch block  is now  present     Urinalysis & Reflex Microscopy With Culture If Indicated    Collection Time: 02/07/23  4:31 PM   Result Value Ref Range    COLOR, URINALYSIS Yellow     APPEARANCE, URINALYSIS Turbid     GLUCOSE, URINALYSIS Negative Negative mg/dL    BILIRUBIN, URINALYSIS Negative Negative    KETONES, URINALYSIS Negative Negative mg/dL    SPECIFIC GRAVITY, URINALYSIS 1.009 1.005 - 1.030    OCCULT BLOOD, URINALYSIS Moderate (A) Negative    PH, URINALYSIS 5.5 5.0 - 7.0    PROTEIN, URINALYSIS 30 (A) Negative mg/dL    UROBILINOGEN, URINALYSIS 0.2 0.2, 1.0 mg/dL    NITRITE, URINALYSIS Negative Negative    LEUKOCYTE ESTERASE, URINALYSIS Large (A) Negative    SQUAMOUS EPITHELIAL, URINALYSIS None Seen None Seen, 1 to 5 /hpf    ERYTHROCYTES, URINALYSIS 6 to 10 (A) None Seen, 1 to 2 /hpf     LEUKOCYTES, URINALYSIS >100 (A) None Seen, 1 to 5 /hpf    BACTERIA, URINALYSIS Large (A) None Seen /hpf    HYALINE CASTS, URINALYSIS None Seen None Seen, 1 to 5 /lpf   Sodium, Urine    Collection Time: 02/07/23  4:31 PM   Result Value Ref Range    Sodium, Urine 27 mmol/L   Protein, Total, Urine    Collection Time: 02/07/23  4:31 PM   Result Value Ref Range    Protein, Urine 37 (H) <12 mg/dL   Creatinine, Urine    Collection Time: 02/07/23  4:31 PM   Result Value Ref Range    Creatinine, Urine 43.60 mg/dL   Potassium    Collection Time: 02/07/23  7:09 PM   Result Value Ref Range    Potassium 5.0 3.4 - 5.1 mmol/L   Magnesium    Collection Time: 02/08/23  6:56 AM   Result Value Ref Range    Magnesium 2.1 1.7 - 2.4 mg/dL   Ferritin    Collection Time: 02/08/23  6:56 AM   Result Value Ref Range    Ferritin 110 26 - 388 ng/mL   Iron And total Iron Binding Capacity    Collection Time: 02/08/23  6:56 AM   Result Value Ref Range    Iron 18 (L) 65 - 175 mcg/dL    Iron Binding Capacity 271 250 - 450 mcg/dL    Iron, Percent Saturation 7 (L) 15 - 45 %   Comprehensive Metabolic Panel    Collection Time: 02/08/23  6:56 AM   Result Value Ref Range    Fasting Status      Sodium 139 135 - 145 mmol/L    Potassium 4.3 3.4 - 5.1 mmol/L    Chloride 108 97 - 110 mmol/L    Carbon Dioxide 22 21 - 32 mmol/L    Anion Gap 13 7 - 19 mmol/L    Glucose 91 70 - 99 mg/dL    BUN 48 (H) 6 - 20 mg/dL    Creatinine 2.49 (H) 0.67 - 1.17 mg/dL    Glomerular Filtration Rate 24 (L) >=60    BUN/ Creatinine Ratio 19 7 - 25    Calcium 8.7 8.4 - 10.2 mg/dL    Bilirubin, Total 0.4 0.2 - 1.0 mg/dL    GOT/ (H) <=37 Units/L    GPT/ (H) <64 Units/L    Alkaline Phosphatase 44 (L) 45 - 117 Units/L    Albumin 2.3 (L) 3.6 - 5.1 g/dL    Protein, Total 5.9 (L) 6.4 - 8.2 g/dL    Globulin 3.6 2.0 - 4.0 g/dL    A/G Ratio 0.6 (L) 1.0 - 2.4   Phosphorus    Collection Time: 02/08/23  6:56 AM   Result Value Ref Range    Phosphorus 3.2 2.4 - 4.7 mg/dL   CBC with  Automated Differential (performable only)    Collection Time: 02/08/23  6:56 AM   Result Value Ref Range    WBC 6.9 4.2 - 11.0 K/mcL    RBC 2.81 (L) 4.50 - 5.90 mil/mcL    HGB 8.3 (L) 13.0 - 17.0 g/dL    HCT 26.1 (L) 39.0 - 51.0 %    MCV 92.9 78.0 - 100.0 fl    MCH 29.5 26.0 - 34.0 pg    MCHC 31.8 (L) 32.0 - 36.5 g/dL    RDW-CV 16.1 (H) 11.0 - 15.0 %    RDW-SD 55.5 (H) 39.0 - 50.0 fL     140 - 450 K/mcL    NRBC 0 <=0 /100 WBC    Neutrophil, Percent 79 %    Lymphocytes, Percent 10 %    Mono, Percent 9 %    Eosinophils, Percent 2 %    Basophils, Percent 0 %    Immature Granulocytes 0 %    Absolute Neutrophils 5.4 1.8 - 7.7 K/mcL    Absolute Lymphocytes 0.7 (L) 1.0 - 4.0 K/mcL    Absolute Monocytes 0.6 0.3 - 0.9 K/mcL    Absolute Eosinophils  0.2 0.0 - 0.5 K/mcL    Absolute Basophils 0.0 0.0 - 0.3 K/mcL    Absolute Immmature Granulocytes 0.0 0.0 - 0.2 K/mcL       Imaging  US KIDNEY BILATERAL    (Results Pending)   FL Video Swallow    (Results Pending)         Assessment and Plan  1. MAX on CKD 4 with mild hyperkalemia             -suspect related to decreased oral intake +diuretics and recent hypotension             -ivf and hold diuretics, some improvement             -renal cs appreciated-pending renal us     2. FTT-weight loss, weakness, decreased appetite             -in part may be related to above.  Monitor response and see if oral intake improves             -hx of dysphagia, swallow eval and GI eval given FTT/significant weight loss and progression of sx's past week   -video swallow             -FLD if no procedures, NPO pending GI eval for possible EGD             -nutrition cs     3. Abnormal LFTs-may be from hypoperfusion w/ recent low bp.  Benign abd exam. Rpt improved. Cont to trend     4. Abn u/a-no dysuria but given extreme weakness may be sx. emperic abx pending ucx     Chronic-resumed home meds excpet as indicated  PVD  BPH  CAD  HLD  HTN-monitoring off meds as above-were discontinued as outpt.  Consider restarting lower dose metoprolol pending bp and hr control as appropriate  pAFib  SSS s/p PPM  Spinal stenosis  Anemia of chronic disease-hg near baseline. Has not tolerated oral iron outpt, would hold on iv iron inpt with possible uti and arrange for outpt      DVT PPX:  Current Active Medications for DVT Prophylaxis (From admission, onward)         Stop     apixaBAN (ELIQUIS) tablet 2.5 mg  2.5 mg,   Oral,   2 times per day         --                Code Status:    Code Status Information     Code Status    Not on file           Dispo:  inpt    D/w pt. Message left for Celia JEFFRIES (POA) \[facility] [place] [facility]\"", DO  2/8/2023   SURG

## 2025-03-14 NOTE — PROGRESS NOTE ADULT - PROBLEM SELECTOR PROBLEM 2
Requested the denial letter to be faxed to us. Will update the encounter once received. Thank you.   
Complete heart block
Tetralogy of Fallot